# Patient Record
Sex: MALE | Race: WHITE | NOT HISPANIC OR LATINO | Employment: FULL TIME | RURAL
[De-identification: names, ages, dates, MRNs, and addresses within clinical notes are randomized per-mention and may not be internally consistent; named-entity substitution may affect disease eponyms.]

---

## 2022-04-14 RX ORDER — LISINOPRIL 10 MG/1
1 TABLET ORAL DAILY
COMMUNITY
Start: 2022-01-14 | End: 2022-04-18 | Stop reason: SDUPTHER

## 2022-04-14 RX ORDER — ATORVASTATIN CALCIUM 20 MG/1
20 TABLET, FILM COATED ORAL DAILY
Qty: 30 TABLET | Refills: 0 | Status: CANCELLED | OUTPATIENT
Start: 2022-04-14

## 2022-04-14 RX ORDER — LISINOPRIL 10 MG/1
10 TABLET ORAL DAILY
Qty: 30 TABLET | Refills: 0 | Status: CANCELLED | OUTPATIENT
Start: 2022-04-14

## 2022-04-14 NOTE — TELEPHONE ENCOUNTER
Incoming Refill Request      Medication requested (name and dose): ATORVASTATIN ??; LISINOPRIL ??    Pharmacy where request should be sent: [facility]    Additional details provided by patient: HAS APPT FOR 4/27/22 WITH WILBERT Cardoza call back number: [phone]    Does the patient have less than a 3 day supply:  [x] Yes  [] No    Gary GARZON Rep  04/14/22, 11:27 EDT

## 2022-04-18 RX ORDER — ATORVASTATIN CALCIUM 20 MG/1
20 TABLET, FILM COATED ORAL DAILY
Qty: 30 TABLET | Refills: 0 | Status: SHIPPED | OUTPATIENT
Start: 2022-04-18 | End: 2022-04-27 | Stop reason: SDUPTHER

## 2022-04-18 RX ORDER — LISINOPRIL 10 MG/1
10 TABLET ORAL DAILY
Qty: 30 TABLET | Refills: 0 | Status: SHIPPED | OUTPATIENT
Start: 2022-04-18 | End: 2022-04-27 | Stop reason: SDUPTHER

## 2022-04-20 ENCOUNTER — TELEPHONE (OUTPATIENT)
Dept: FAMILY MEDICINE CLINIC | Facility: CLINIC | Age: 62
End: 2022-04-20

## 2022-04-20 NOTE — TELEPHONE ENCOUNTER
Please check with patient- not sure why he said med was denied- looks like refilled atorvastatin and lisinopril yesterday upon request-- please make sure pharmacy received. Thanks.

## 2022-04-27 ENCOUNTER — OFFICE VISIT (OUTPATIENT)
Dept: FAMILY MEDICINE CLINIC | Facility: CLINIC | Age: 62
End: 2022-04-27

## 2022-04-27 VITALS
WEIGHT: 186 LBS | DIASTOLIC BLOOD PRESSURE: 82 MMHG | BODY MASS INDEX: 29.19 KG/M2 | HEIGHT: 67 IN | SYSTOLIC BLOOD PRESSURE: 122 MMHG | HEART RATE: 75 BPM | OXYGEN SATURATION: 97 %

## 2022-04-27 DIAGNOSIS — I10 HYPERTENSION, ESSENTIAL: ICD-10-CM

## 2022-04-27 DIAGNOSIS — L72.3 SEBACEOUS CYST: Primary | ICD-10-CM

## 2022-04-27 DIAGNOSIS — E78.2 HYPERLIPIDEMIA, MIXED: ICD-10-CM

## 2022-04-27 PROCEDURE — 99214 OFFICE O/P EST MOD 30 MIN: CPT | Performed by: PHYSICIAN ASSISTANT

## 2022-04-27 RX ORDER — ATORVASTATIN CALCIUM 20 MG/1
20 TABLET, FILM COATED ORAL DAILY
Qty: 90 TABLET | Refills: 1 | Status: SHIPPED | OUTPATIENT
Start: 2022-04-27 | End: 2022-11-28

## 2022-04-27 RX ORDER — LISINOPRIL 10 MG/1
10 TABLET ORAL DAILY
Qty: 90 TABLET | Refills: 1 | Status: SHIPPED | OUTPATIENT
Start: 2022-04-27 | End: 2022-11-28

## 2022-04-27 RX ORDER — DOXYCYCLINE HYCLATE 100 MG/1
100 CAPSULE ORAL 2 TIMES DAILY
Qty: 20 CAPSULE | Refills: 0 | Status: SHIPPED | OUTPATIENT
Start: 2022-04-27 | End: 2022-12-12

## 2022-04-27 NOTE — PROGRESS NOTES
"Chief Complaint  Cyst (C/O cyst on back onset about 2 weeks. ), Hyperlipidemia, and Hypertension    Subjective          Kirt Lundberg presents to [facility]  Patient in today for medication recheck and refills.  States blood pressure has been doing well.  Is here fasting for his labs.  He has been bothered by cyst on his upper back for the last 2 to 3 weeks.  States has a history of sebaceous cyst but often times they go away on their own.  Denies any fever associated.  Is due for colon cancer screening.  States will consider to have colonoscopy done and call us back if would like to schedule.  Denies any abnormal changes to bowel habits.    Cyst  This is a new problem. Pertinent negatives include no abdominal pain, chest pain, congestion, fever, nausea, sore throat or vomiting.   Hyperlipidemia  This is a chronic problem. Pertinent negatives include no chest pain or shortness of breath. Current antihyperlipidemic treatment includes statins. Risk factors for coronary artery disease include male sex, dyslipidemia and hypertension.   Hypertension  This is a chronic problem. Pertinent negatives include no chest pain, peripheral edema or shortness of breath. Current antihypertension treatment includes ACE inhibitors.       Objective   Vital Signs:   /82   Pulse 75   Ht 170.2 cm (67\")   Wt 84.4 kg (186 lb)   SpO2 97%   BMI 29.13 kg/m²     Body mass index is 29.13 kg/m².    Review of Systems   Constitutional: Negative for fever.   HENT: Negative for congestion and sore throat.    Respiratory: Negative for shortness of breath.    Cardiovascular: Negative for chest pain and leg swelling.   Gastrointestinal: Negative for abdominal pain, nausea and vomiting.   Skin: Positive for skin lesions.       Past History:  Medical History: has a past medical history of GERD (gastroesophageal reflux disease), Hyperlipidemia, Hypertension, and IBS (irritable bowel syndrome).   Surgical History: " has no past surgical history on file.   Family History: family history includes Heart attack in his father; Hypertension in his father and mother; Throat cancer in his father.   Social History: reports that he quit smoking about 19 years ago. His smoking use included cigarettes. He started smoking about 49 years ago. He has a 15.00 pack-year smoking history. He has never used smokeless tobacco. He reports current alcohol use. Drug use questions deferred to the physician.      Current Outpatient Medications:   •  atorvastatin (LIPITOR) 20 MG tablet, Take 1 tablet by mouth Daily., Disp: 90 tablet, Rfl: 1  •  lisinopril (PRINIVIL,ZESTRIL) 10 MG tablet, Take 1 tablet by mouth Daily., Disp: 90 tablet, Rfl: 1  •  doxycycline (VIBRAMYCIN) 100 MG capsule, Take 1 capsule by mouth 2 (Two) Times a Day., Disp: 20 capsule, Rfl: 0  Allergies: Patient has no known allergies.    Physical Exam  Constitutional:       Appearance: Normal appearance.   HENT:      Right Ear: Tympanic membrane normal.      Left Ear: Tympanic membrane normal.      Mouth/Throat:      Pharynx: Oropharynx is clear.   Eyes:      Conjunctiva/sclera: Conjunctivae normal.      Pupils: Pupils are equal, round, and reactive to light.   Cardiovascular:      Rate and Rhythm: Normal rate and regular rhythm.      Heart sounds: Normal heart sounds.   Pulmonary:      Effort: Pulmonary effort is normal.      Breath sounds: Normal breath sounds.   Abdominal:      Palpations: Abdomen is soft.      Tenderness: There is no abdominal tenderness.   Skin:     Comments: Large cyst to upper back  1 1/2 inch in diameter with mild redness around border and thickness underneath   Neurological:      Mental Status: He is oriented to person, place, and time.   Psychiatric:         Mood and Affect: Mood normal.         Behavior: Behavior normal.             Assessment and Plan   Diagnoses and all orders for this visit:    1. Sebaceous cyst (Primary)  Will start on antibiotic and get in  with general surgery for consult to area . Warm compresses to area.   2. Hypertension, essential  -     CBC & Differential; Future  -     Comprehensive Metabolic Panel; Future  -     TSH; Future  Continue current medication. Encouraged healthy diet/exercise. RTC prior to recheck as needed. Encouraged to get in for screening colonoscopy.   3. Hyperlipidemia, mixed  -     Lipid Panel; Future  Fasting labs today. Continue atorvastatin.   Other orders  -     doxycycline (VIBRAMYCIN) 100 MG capsule; Take 1 capsule by mouth 2 (Two) Times a Day.  Dispense: 20 capsule; Refill: 0  -     atorvastatin (LIPITOR) 20 MG tablet; Take 1 tablet by mouth Daily.  Dispense: 90 tablet; Refill: 1  -     lisinopril (PRINIVIL,ZESTRIL) 10 MG tablet; Take 1 tablet by mouth Daily.  Dispense: 90 tablet; Refill: 1            Follow Up   Return in about 6 months (around 10/27/2022).  Patient was given instructions and counseling regarding his condition or for health maintenance advice. Please see specific information pulled into the AVS if appropriate.     Lizabeth Mane PA-C

## 2022-04-28 LAB
ALBUMIN SERPL-MCNC: 4.6 G/DL (ref 3.8–4.8)
ALBUMIN/GLOB SERPL: 1.8 {RATIO} (ref 1.2–2.2)
ALP SERPL-CCNC: 142 IU/L (ref 44–121)
ALT SERPL-CCNC: 24 IU/L (ref 0–44)
AST SERPL-CCNC: 23 IU/L (ref 0–40)
BASOPHILS # BLD AUTO: 0.1 X10E3/UL (ref 0–0.2)
BASOPHILS NFR BLD AUTO: 1 %
BILIRUB SERPL-MCNC: 0.3 MG/DL (ref 0–1.2)
BUN SERPL-MCNC: 17 MG/DL (ref 8–27)
BUN/CREAT SERPL: 16 (ref 10–24)
CALCIUM SERPL-MCNC: 9.3 MG/DL (ref 8.6–10.2)
CHLORIDE SERPL-SCNC: 104 MMOL/L (ref 96–106)
CHOLEST SERPL-MCNC: 196 MG/DL (ref 100–199)
CO2 SERPL-SCNC: 18 MMOL/L (ref 20–29)
CREAT SERPL-MCNC: 1.05 MG/DL (ref 0.76–1.27)
EGFRCR SERPLBLD CKD-EPI 2021: 80 ML/MIN/1.73
EOSINOPHIL # BLD AUTO: 0.4 X10E3/UL (ref 0–0.4)
EOSINOPHIL NFR BLD AUTO: 4 %
ERYTHROCYTE [DISTWIDTH] IN BLOOD BY AUTOMATED COUNT: 13.6 % (ref 11.6–15.4)
GLOBULIN SER CALC-MCNC: 2.6 G/DL (ref 1.5–4.5)
GLUCOSE SERPL-MCNC: 86 MG/DL (ref 65–99)
HCT VFR BLD AUTO: 45.4 % (ref 37.5–51)
HDLC SERPL-MCNC: 44 MG/DL
HGB BLD-MCNC: 15.8 G/DL (ref 13–17.7)
IMM GRANULOCYTES # BLD AUTO: 0.1 X10E3/UL (ref 0–0.1)
IMM GRANULOCYTES NFR BLD AUTO: 1 %
LDLC SERPL CALC-MCNC: 129 MG/DL (ref 0–99)
LYMPHOCYTES # BLD AUTO: 3 X10E3/UL (ref 0.7–3.1)
LYMPHOCYTES NFR BLD AUTO: 28 %
MCH RBC QN AUTO: 28.9 PG (ref 26.6–33)
MCHC RBC AUTO-ENTMCNC: 34.8 G/DL (ref 31.5–35.7)
MCV RBC AUTO: 83 FL (ref 79–97)
MONOCYTES # BLD AUTO: 0.8 X10E3/UL (ref 0.1–0.9)
MONOCYTES NFR BLD AUTO: 8 %
NEUTROPHILS # BLD AUTO: 6.4 X10E3/UL (ref 1.4–7)
NEUTROPHILS NFR BLD AUTO: 58 %
PLATELET # BLD AUTO: 328 X10E3/UL (ref 150–450)
POTASSIUM SERPL-SCNC: 4.9 MMOL/L (ref 3.5–5.2)
PROT SERPL-MCNC: 7.2 G/DL (ref 6–8.5)
RBC # BLD AUTO: 5.46 X10E6/UL (ref 4.14–5.8)
SODIUM SERPL-SCNC: 142 MMOL/L (ref 134–144)
TRIGL SERPL-MCNC: 130 MG/DL (ref 0–149)
TSH SERPL DL<=0.005 MIU/L-ACNC: 2.49 UIU/ML (ref 0.45–4.5)
VLDLC SERPL CALC-MCNC: 23 MG/DL (ref 5–40)
WBC # BLD AUTO: 10.8 X10E3/UL (ref 3.4–10.8)

## 2022-05-01 DIAGNOSIS — R74.8 ELEVATED ALKALINE PHOSPHATASE LEVEL: Primary | ICD-10-CM

## 2022-05-02 ENCOUNTER — TELEPHONE (OUTPATIENT)
Dept: FAMILY MEDICINE CLINIC | Facility: CLINIC | Age: 62
End: 2022-05-02

## 2022-09-19 ENCOUNTER — TELEPHONE (OUTPATIENT)
Dept: FAMILY MEDICINE CLINIC | Facility: CLINIC | Age: 62
End: 2022-09-19

## 2022-10-11 ENCOUNTER — LAB (OUTPATIENT)
Dept: FAMILY MEDICINE CLINIC | Facility: CLINIC | Age: 62
End: 2022-10-11

## 2022-10-11 DIAGNOSIS — R74.8 ELEVATED ALKALINE PHOSPHATASE LEVEL: ICD-10-CM

## 2022-10-11 PROCEDURE — 36415 COLL VENOUS BLD VENIPUNCTURE: CPT | Performed by: PHYSICIAN ASSISTANT

## 2022-10-12 LAB
ALBUMIN SERPL-MCNC: 4.5 G/DL (ref 3.8–4.8)
ALBUMIN/GLOB SERPL: 2 {RATIO} (ref 1.2–2.2)
ALP SERPL-CCNC: 143 IU/L (ref 44–121)
ALT SERPL-CCNC: 38 IU/L (ref 0–44)
AST SERPL-CCNC: 29 IU/L (ref 0–40)
BILIRUB SERPL-MCNC: 0.3 MG/DL (ref 0–1.2)
BUN SERPL-MCNC: 14 MG/DL (ref 8–27)
BUN/CREAT SERPL: 14 (ref 10–24)
CALCIUM SERPL-MCNC: 9 MG/DL (ref 8.6–10.2)
CHLORIDE SERPL-SCNC: 106 MMOL/L (ref 96–106)
CO2 SERPL-SCNC: 22 MMOL/L (ref 20–29)
CREAT SERPL-MCNC: 1.03 MG/DL (ref 0.76–1.27)
EGFRCR SERPLBLD CKD-EPI 2021: 82 ML/MIN/1.73
GLOBULIN SER CALC-MCNC: 2.2 G/DL (ref 1.5–4.5)
GLUCOSE SERPL-MCNC: 103 MG/DL (ref 70–99)
POTASSIUM SERPL-SCNC: 4.3 MMOL/L (ref 3.5–5.2)
PROT SERPL-MCNC: 6.7 G/DL (ref 6–8.5)
SODIUM SERPL-SCNC: 142 MMOL/L (ref 134–144)

## 2022-10-13 DIAGNOSIS — R74.8 ELEVATED ALKALINE PHOSPHATASE LEVEL: Primary | ICD-10-CM

## 2022-11-28 RX ORDER — LISINOPRIL 10 MG/1
TABLET ORAL
Qty: 90 TABLET | Refills: 0 | Status: SHIPPED | OUTPATIENT
Start: 2022-11-28 | End: 2022-12-12 | Stop reason: SDUPTHER

## 2022-11-28 RX ORDER — ATORVASTATIN CALCIUM 20 MG/1
TABLET, FILM COATED ORAL
Qty: 90 TABLET | Refills: 0 | Status: SHIPPED | OUTPATIENT
Start: 2022-11-28 | End: 2022-12-12 | Stop reason: SDUPTHER

## 2022-12-12 ENCOUNTER — OFFICE VISIT (OUTPATIENT)
Dept: FAMILY MEDICINE CLINIC | Facility: CLINIC | Age: 62
End: 2022-12-12

## 2022-12-12 VITALS
BODY MASS INDEX: 30.29 KG/M2 | HEART RATE: 71 BPM | HEIGHT: 67 IN | DIASTOLIC BLOOD PRESSURE: 74 MMHG | OXYGEN SATURATION: 97 % | SYSTOLIC BLOOD PRESSURE: 112 MMHG | WEIGHT: 193 LBS

## 2022-12-12 DIAGNOSIS — R73.9 HYPERGLYCEMIA: ICD-10-CM

## 2022-12-12 DIAGNOSIS — I10 HYPERTENSION, ESSENTIAL: Primary | ICD-10-CM

## 2022-12-12 DIAGNOSIS — Z11.59 ENCOUNTER FOR HEPATITIS C SCREENING TEST FOR LOW RISK PATIENT: ICD-10-CM

## 2022-12-12 DIAGNOSIS — Z12.5 PROSTATE CANCER SCREENING: ICD-10-CM

## 2022-12-12 DIAGNOSIS — E78.2 HYPERLIPIDEMIA, MIXED: ICD-10-CM

## 2022-12-12 PROCEDURE — 36415 COLL VENOUS BLD VENIPUNCTURE: CPT | Performed by: PHYSICIAN ASSISTANT

## 2022-12-12 PROCEDURE — 99214 OFFICE O/P EST MOD 30 MIN: CPT | Performed by: PHYSICIAN ASSISTANT

## 2022-12-12 RX ORDER — LISINOPRIL 10 MG/1
10 TABLET ORAL DAILY
Qty: 90 TABLET | Refills: 0 | Status: SHIPPED | OUTPATIENT
Start: 2022-12-12 | End: 2023-03-03

## 2022-12-12 RX ORDER — ATORVASTATIN CALCIUM 20 MG/1
20 TABLET, FILM COATED ORAL DAILY
Qty: 90 TABLET | Refills: 0 | Status: SHIPPED | OUTPATIENT
Start: 2022-12-12 | End: 2023-03-03

## 2022-12-12 NOTE — PROGRESS NOTES
"Chief Complaint  Med Refill (Check up and BW )    Subjective          Kirt Lundberg presents to [facility]  History of Present Illness  Patient in  today for medication recheck and refills.  States has been feeling well.  States his blood pressure has been doing well.  Denies any chest pain or shortness of breath.  He is in follow-up with GI for mild elevation to alk phosphatase.  He has a colonoscopy scheduled next month.  Hypertension  This is a chronic problem. Pertinent negatives include no blurred vision, chest pain, headaches, palpitations, peripheral edema or shortness of breath. Current antihypertension treatment includes ACE inhibitors.   Hyperlipidemia  This is a chronic problem. Pertinent negatives include no chest pain or shortness of breath. Current antihyperlipidemic treatment includes statins.       Objective   Vital Signs:   /74 (BP Location: Left arm, Patient Position: Sitting, Cuff Size: Large Adult)   Pulse 71   Ht 170.2 cm (67\")   Wt 87.5 kg (193 lb)   SpO2 97%   BMI 30.23 kg/m²     Body mass index is 30.23 kg/m².    Review of Systems   Constitutional: Negative for fatigue and fever.   HENT: Negative for congestion and sore throat.    Eyes: Negative for blurred vision.   Respiratory: Negative for shortness of breath.    Cardiovascular: Negative for chest pain and palpitations.   Gastrointestinal: Negative for abdominal pain, blood in stool, diarrhea, nausea and vomiting.   Neurological: Negative for dizziness and headache.       Past History:  Medical History: has a past medical history of GERD (gastroesophageal reflux disease), Hyperlipidemia, Hypertension, and IBS (irritable bowel syndrome).   Surgical History: has no past surgical history on file.   Family History: family history includes Heart attack in his father; Hypertension in his father and mother; Throat cancer in his father.   Social History: reports that he has been smoking cigars. He has " never used smokeless tobacco. He reports current alcohol use. Drug use questions deferred to the physician.      Current Outpatient Medications:   •  atorvastatin (LIPITOR) 20 MG tablet, Take 1 tablet by mouth Daily., Disp: 90 tablet, Rfl: 0  •  lisinopril (PRINIVIL,ZESTRIL) 10 MG tablet, Take 1 tablet by mouth Daily., Disp: 90 tablet, Rfl: 0  Allergies: Patient has no known allergies.    Physical Exam  Constitutional:       Appearance: Normal appearance.   HENT:      Right Ear: Tympanic membrane normal.      Left Ear: Tympanic membrane normal.      Mouth/Throat:      Pharynx: Oropharynx is clear.   Eyes:      Conjunctiva/sclera: Conjunctivae normal.      Pupils: Pupils are equal, round, and reactive to light.   Cardiovascular:      Rate and Rhythm: Normal rate and regular rhythm.      Heart sounds: Normal heart sounds.   Pulmonary:      Effort: Pulmonary effort is normal.      Breath sounds: Normal breath sounds.   Abdominal:      Palpations: Abdomen is soft.      Tenderness: There is no abdominal tenderness.   Musculoskeletal:      Right lower leg: No edema.      Left lower leg: No edema.   Neurological:      Mental Status: He is oriented to person, place, and time.   Psychiatric:         Mood and Affect: Mood normal.         Behavior: Behavior normal.             Assessment and Plan   Diagnoses and all orders for this visit:    1. Hypertension, essential (Primary)  -     CBC & Differential; Future  -     CBC & Differential  Continue current medication.  Encouraged healthy diet and exercise.  Fasting labs today.  Return to clinic prior to recheck as needed.  2. Hyperlipidemia, mixed  -     Lipid Panel; Future  -     Lipid Panel  Refilled atorvastatin.  3. Prostate cancer screening  -     PSA Screen; Future  -     PSA Screen  PSA with labs.  4. Encounter for hepatitis C screening test for low risk patient  -     Hepatitis C Antibody; Future  -     Hepatitis C Antibody    5. Hyperglycemia  -     Comprehensive  Metabolic Panel; Future  -     Hemoglobin A1c; Future  -     Comprehensive Metabolic Panel  -     Hemoglobin A1c  Check hemoglobin A1c.  Encouraged healthy diet and exercise.  Other orders  -     atorvastatin (LIPITOR) 20 MG tablet; Take 1 tablet by mouth Daily.  Dispense: 90 tablet; Refill: 0  -     lisinopril (PRINIVIL,ZESTRIL) 10 MG tablet; Take 1 tablet by mouth Daily.  Dispense: 90 tablet; Refill: 0            Follow Up   Return in about 6 months (around 6/12/2023).  Patient was given instructions and counseling regarding his condition or for health maintenance advice. Please see specific information pulled into the AVS if appropriate.     Lizabeth Mane PA-C

## 2022-12-13 DIAGNOSIS — D72.829 LEUKOCYTOSIS, UNSPECIFIED TYPE: Primary | ICD-10-CM

## 2022-12-13 LAB
ALBUMIN SERPL-MCNC: 4.4 G/DL (ref 3.8–4.8)
ALBUMIN/GLOB SERPL: 1.9 {RATIO} (ref 1.2–2.2)
ALP SERPL-CCNC: 138 IU/L (ref 44–121)
ALT SERPL-CCNC: 36 IU/L (ref 0–44)
AST SERPL-CCNC: 26 IU/L (ref 0–40)
BASOPHILS # BLD AUTO: 0.1 X10E3/UL (ref 0–0.2)
BASOPHILS NFR BLD AUTO: 1 %
BILIRUB SERPL-MCNC: 0.3 MG/DL (ref 0–1.2)
BUN SERPL-MCNC: 15 MG/DL (ref 8–27)
BUN/CREAT SERPL: 14 (ref 10–24)
CALCIUM SERPL-MCNC: 9.8 MG/DL (ref 8.6–10.2)
CHLORIDE SERPL-SCNC: 106 MMOL/L (ref 96–106)
CHOLEST SERPL-MCNC: 138 MG/DL (ref 100–199)
CO2 SERPL-SCNC: 22 MMOL/L (ref 20–29)
CREAT SERPL-MCNC: 1.08 MG/DL (ref 0.76–1.27)
EGFRCR SERPLBLD CKD-EPI 2021: 78 ML/MIN/1.73
EOSINOPHIL # BLD AUTO: 0.5 X10E3/UL (ref 0–0.4)
EOSINOPHIL NFR BLD AUTO: 4 %
ERYTHROCYTE [DISTWIDTH] IN BLOOD BY AUTOMATED COUNT: 13.6 % (ref 11.6–15.4)
GLOBULIN SER CALC-MCNC: 2.3 G/DL (ref 1.5–4.5)
GLUCOSE SERPL-MCNC: 105 MG/DL (ref 70–99)
HBA1C MFR BLD: 5.6 % (ref 4.8–5.6)
HCT VFR BLD AUTO: 46.2 % (ref 37.5–51)
HCV AB S/CO SERPL IA: <0.1 S/CO RATIO (ref 0–0.9)
HDLC SERPL-MCNC: 42 MG/DL
HGB BLD-MCNC: 15.4 G/DL (ref 13–17.7)
IMM GRANULOCYTES # BLD AUTO: 0 X10E3/UL (ref 0–0.1)
IMM GRANULOCYTES NFR BLD AUTO: 0 %
LDLC SERPL CALC-MCNC: 74 MG/DL (ref 0–99)
LYMPHOCYTES # BLD AUTO: 3.2 X10E3/UL (ref 0.7–3.1)
LYMPHOCYTES NFR BLD AUTO: 29 %
MCH RBC QN AUTO: 28.5 PG (ref 26.6–33)
MCHC RBC AUTO-ENTMCNC: 33.3 G/DL (ref 31.5–35.7)
MCV RBC AUTO: 86 FL (ref 79–97)
MONOCYTES # BLD AUTO: 0.9 X10E3/UL (ref 0.1–0.9)
MONOCYTES NFR BLD AUTO: 8 %
NEUTROPHILS # BLD AUTO: 6.5 X10E3/UL (ref 1.4–7)
NEUTROPHILS NFR BLD AUTO: 58 %
PLATELET # BLD AUTO: 289 X10E3/UL (ref 150–450)
POTASSIUM SERPL-SCNC: 5 MMOL/L (ref 3.5–5.2)
PROT SERPL-MCNC: 6.7 G/DL (ref 6–8.5)
PSA SERPL-MCNC: 1.3 NG/ML (ref 0–4)
RBC # BLD AUTO: 5.4 X10E6/UL (ref 4.14–5.8)
SODIUM SERPL-SCNC: 141 MMOL/L (ref 134–144)
TRIGL SERPL-MCNC: 122 MG/DL (ref 0–149)
VLDLC SERPL CALC-MCNC: 22 MG/DL (ref 5–40)
WBC # BLD AUTO: 11.2 X10E3/UL (ref 3.4–10.8)

## 2023-01-30 ENCOUNTER — LAB (OUTPATIENT)
Dept: FAMILY MEDICINE CLINIC | Facility: CLINIC | Age: 63
End: 2023-01-30
Payer: COMMERCIAL

## 2023-01-30 DIAGNOSIS — D72.829 LEUKOCYTOSIS, UNSPECIFIED TYPE: ICD-10-CM

## 2023-01-30 PROCEDURE — 36415 COLL VENOUS BLD VENIPUNCTURE: CPT | Performed by: PHYSICIAN ASSISTANT

## 2023-01-31 DIAGNOSIS — D72.829 LEUKOCYTOSIS, UNSPECIFIED TYPE: Primary | ICD-10-CM

## 2023-01-31 LAB
BASOPHILS # BLD AUTO: 0.1 X10E3/UL (ref 0–0.2)
BASOPHILS NFR BLD AUTO: 1 %
EOSINOPHIL # BLD AUTO: 0.4 X10E3/UL (ref 0–0.4)
EOSINOPHIL NFR BLD AUTO: 3 %
ERYTHROCYTE [DISTWIDTH] IN BLOOD BY AUTOMATED COUNT: 13.6 % (ref 11.6–15.4)
HCT VFR BLD AUTO: 44.9 % (ref 37.5–51)
HGB BLD-MCNC: 14.9 G/DL (ref 13–17.7)
IMM GRANULOCYTES # BLD AUTO: 0.1 X10E3/UL (ref 0–0.1)
IMM GRANULOCYTES NFR BLD AUTO: 1 %
LYMPHOCYTES # BLD AUTO: 4 X10E3/UL (ref 0.7–3.1)
LYMPHOCYTES NFR BLD AUTO: 36 %
MCH RBC QN AUTO: 28.7 PG (ref 26.6–33)
MCHC RBC AUTO-ENTMCNC: 33.2 G/DL (ref 31.5–35.7)
MCV RBC AUTO: 86 FL (ref 79–97)
MONOCYTES # BLD AUTO: 1.1 X10E3/UL (ref 0.1–0.9)
MONOCYTES NFR BLD AUTO: 10 %
NEUTROPHILS # BLD AUTO: 5.5 X10E3/UL (ref 1.4–7)
NEUTROPHILS NFR BLD AUTO: 49 %
PLATELET # BLD AUTO: 314 X10E3/UL (ref 150–450)
RBC # BLD AUTO: 5.2 X10E6/UL (ref 4.14–5.8)
WBC # BLD AUTO: 11.1 X10E3/UL (ref 3.4–10.8)

## 2023-03-03 RX ORDER — ATORVASTATIN CALCIUM 20 MG/1
TABLET, FILM COATED ORAL
Qty: 90 TABLET | Refills: 0 | Status: SHIPPED | OUTPATIENT
Start: 2023-03-03

## 2023-03-03 RX ORDER — LISINOPRIL 10 MG/1
TABLET ORAL
Qty: 90 TABLET | Refills: 0 | Status: SHIPPED | OUTPATIENT
Start: 2023-03-03

## 2023-03-14 ENCOUNTER — LAB (OUTPATIENT)
Dept: FAMILY MEDICINE CLINIC | Facility: CLINIC | Age: 63
End: 2023-03-14
Payer: COMMERCIAL

## 2023-03-14 DIAGNOSIS — D72.829 LEUKOCYTOSIS, UNSPECIFIED TYPE: ICD-10-CM

## 2023-03-14 PROCEDURE — 36415 COLL VENOUS BLD VENIPUNCTURE: CPT | Performed by: PHYSICIAN ASSISTANT

## 2023-03-15 DIAGNOSIS — D72.829 LEUKOCYTOSIS, UNSPECIFIED TYPE: Primary | ICD-10-CM

## 2023-03-15 LAB
BASOPHILS # BLD AUTO: 0.1 X10E3/UL (ref 0–0.2)
BASOPHILS NFR BLD AUTO: 1 %
EOSINOPHIL # BLD AUTO: 0.4 X10E3/UL (ref 0–0.4)
EOSINOPHIL NFR BLD AUTO: 4 %
ERYTHROCYTE [DISTWIDTH] IN BLOOD BY AUTOMATED COUNT: 13.6 % (ref 11.6–15.4)
HCT VFR BLD AUTO: 43.9 % (ref 37.5–51)
HGB BLD-MCNC: 14.6 G/DL (ref 13–17.7)
IMM GRANULOCYTES # BLD AUTO: 0 X10E3/UL (ref 0–0.1)
IMM GRANULOCYTES NFR BLD AUTO: 0 %
LYMPHOCYTES # BLD AUTO: 4 X10E3/UL (ref 0.7–3.1)
LYMPHOCYTES NFR BLD AUTO: 36 %
MCH RBC QN AUTO: 28.3 PG (ref 26.6–33)
MCHC RBC AUTO-ENTMCNC: 33.3 G/DL (ref 31.5–35.7)
MCV RBC AUTO: 85 FL (ref 79–97)
MONOCYTES # BLD AUTO: 0.9 X10E3/UL (ref 0.1–0.9)
MONOCYTES NFR BLD AUTO: 8 %
NEUTROPHILS # BLD AUTO: 5.5 X10E3/UL (ref 1.4–7)
NEUTROPHILS NFR BLD AUTO: 51 %
PLATELET # BLD AUTO: 299 X10E3/UL (ref 150–450)
RBC # BLD AUTO: 5.16 X10E6/UL (ref 4.14–5.8)
WBC # BLD AUTO: 10.9 X10E3/UL (ref 3.4–10.8)

## 2023-04-25 ENCOUNTER — CONSULT (OUTPATIENT)
Dept: ONCOLOGY | Facility: CLINIC | Age: 63
End: 2023-04-25
Payer: COMMERCIAL

## 2023-04-25 VITALS
HEIGHT: 67 IN | SYSTOLIC BLOOD PRESSURE: 139 MMHG | HEART RATE: 66 BPM | RESPIRATION RATE: 18 BRPM | BODY MASS INDEX: 30.76 KG/M2 | DIASTOLIC BLOOD PRESSURE: 82 MMHG | WEIGHT: 196 LBS | TEMPERATURE: 98.1 F | OXYGEN SATURATION: 97 %

## 2023-04-25 DIAGNOSIS — D72.820 LYMPHOCYTOSIS: Primary | ICD-10-CM

## 2023-04-25 DIAGNOSIS — C91.10 CLL (CHRONIC LYMPHOCYTIC LEUKEMIA): ICD-10-CM

## 2023-04-25 PROBLEM — D72.829 LEUCOCYTOSIS: Status: ACTIVE | Noted: 2023-04-25

## 2023-04-25 PROCEDURE — 99204 OFFICE O/P NEW MOD 45 MIN: CPT | Performed by: INTERNAL MEDICINE

## 2023-04-25 NOTE — PROGRESS NOTES
CHIEF COMPLAINT: No somatic complaints    REASON FOR REFERRAL: Chronic lymphocytosis      RECORDS OBTAINED  Records of the patients history including those obtained from primary care were reviewed and summarized in detail.    Oncology/Hematology History Overview Note   1.  Leukocytosis  2.  Hypertension  3.  Hyperlipidemia  4.  Hyperglycemia  5.  Elevated alkaline phosphatase 142 on 4/27/2022.  143 on 10/11/2022.  AMA - 11/15/2022 with fractionated alkaline phosphatase normal percentages predominantly liver.  .  12/1/2022 liver ultrasound showing fatty liver.  Gastroenterology recommended screening colonoscopy which was performed by Lucien Aguilar on 1/11/2023 showing diverticula with three 2 mm sessile polyps in the ascending colon with single polypectomy performed in the ascending colon and repeat colonoscopy recommended in 3-5 years.  6.  History of reflux and irritable bowel    Hematology history timeline:  -4/27/2022 white count 10,800 normal with upper limit of normal 10,800 unremarkable CBC  -12/12/2022 white count 11,200 and unremarkable differential with absolute lymphocyte count 3200 upper limit of normal 3100  -1/30/2023 white count 11,100 absolute lymphocyte count 4000  -3/14/2023 white count 10,900 absolute lymphocyte count 4000     Lymphocytosis       HISTORY OF PRESENT ILLNESS:  The patient is a 63 y.o.  male, referred for chronic lymphocytosis without B symptoms    REVIEW OF SYSTEMS:  No somatic complaints    Past Medical History:   Diagnosis Date   • GERD (gastroesophageal reflux disease)    • Hyperlipidemia    • Hypertension    • IBS (irritable bowel syndrome)      No past surgical history on file.    Current Outpatient Medications on File Prior to Visit   Medication Sig Dispense Refill   • atorvastatin (LIPITOR) 20 MG tablet TAKE ONE TABLET BY MOUTH DAILY 90 tablet 0   • lisinopril (PRINIVIL,ZESTRIL) 10 MG tablet TAKE ONE TABLET BY MOUTH DAILY 90 tablet 0     No current facility-administered  "medications on file prior to visit.       No Known Allergies    Social History     Socioeconomic History   • Marital status:    Tobacco Use   • Smoking status: Some Days     Types: Cigars   • Smokeless tobacco: Never   Vaping Use   • Vaping Use: Never used   Substance and Sexual Activity   • Alcohol use: Yes     Comment: Rare   • Drug use: Defer   • Sexual activity: Defer       Family History   Problem Relation Age of Onset   • Hypertension Mother    • Hypertension Father    • Throat cancer Father    • Heart attack Father        PHYSICAL EXAM:  No palpable cervical axillary or inguinal adenopathy.  No hepatosplenomegaly.  No rashes.    /82   Pulse 66   Temp 98.1 °F (36.7 °C)   Resp 18   Ht 170.2 cm (67\")   Wt 88.9 kg (196 lb)   SpO2 97%   BMI 30.70 kg/m²     ECOG score: 0           ECOG: (0) Fully Active - Able to Carry On All Pre-disease Performance Without Restriction    Lab Results   Component Value Date    HGB 14.6 03/14/2023    HCT 43.9 03/14/2023    MCV 85 03/14/2023     03/14/2023    WBC 10.9 (H) 03/14/2023    NEUTROABS 5.5 03/14/2023    LYMPHSABS 4.0 (H) 03/14/2023    MONOSABS 0.9 03/14/2023    EOSABS 0.4 03/14/2023    BASOSABS 0.1 03/14/2023     Lab Results   Component Value Date    GLUCOSE 105 (H) 12/12/2022    BUN 15 12/12/2022    CREATININE 1.08 12/12/2022     12/12/2022    K 5.0 12/12/2022     12/12/2022    CO2 22 12/12/2022    CALCIUM 9.8 12/12/2022    ALBUMIN 4.4 12/12/2022    BILITOT 0.3 12/12/2022    ALKPHOS 138 (H) 12/12/2022    AST 26 12/12/2022    ALT 36 12/12/2022         Assessment & Plan   1.  Leukocytosis  2.  Hypertension  3.  Hyperlipidemia  4.  Hyperglycemia  5.  Elevated alkaline phosphatase 142 on 4/27/2022.  143 on 10/11/2022.  AMA - 11/15/2022 with fractionated alkaline phosphatase normal percentages predominantly liver.  .  12/1/2022 liver ultrasound showing fatty liver.  Gastroenterology recommended screening colonoscopy which was " performed by Lucien Aguilar on 1/11/2023 showing diverticula with three 2 mm sessile polyps in the ascending colon with single polypectomy performed in the ascending colon and repeat colonoscopy recommended in 3-5 years.  6.  History of reflux and irritable bowel    Hematology history timeline:  -4/27/2022 white count 10,800 normal with upper limit of normal 10,800 unremarkable CBC  -12/12/2022 white count 11,200 and unremarkable differential with absolute lymphocyte count 3200 upper limit of normal 3100  -1/30/2023 white count 11,100 absolute lymphocyte count 4000  -3/14/2023 white count 10,900 absolute lymphocyte count 4000    -4/25/2023 [facility] consult: Patient here for evaluation of chronic mild lymphocytosis dating back at least a year and never with an absolute lymphocyte count over 5000.  No bed drenching night sweats or unexplained weight loss or unexplained fevers.  No bulky adenopathy.  Does have fatty liver disease with elevated alkaline phosphatase evaluated by gastroenterology previously.  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  The odds are this is some low-level reactive process but we shall see.    Total time of care today inclusive of time spent today prior to patient's arrival reviewing past medical records and cataloging data as outlined and during visit going over the broad differential of lymphocytosis and the work-up thereof and after visit instituting the plan as outlined above took 45 minutes of patient care time throughout the day today.      Hector Hemphill MD    4/25/2023

## 2023-04-25 NOTE — LETTER
April 25, 2023     Lizabeth Mane PA-C  [address] [facility] [address]    Patient: Kirt Lundberg   YOB: 1960   Date of Visit: 4/25/2023       Dear Lizabeth Mane PA-C    Kirt Lundberg was in my office today. Below is a copy of my note.    If you have questions, please do not hesitate to call me. I look forward to following Kirt along with you.         Sincerely,        Hector Hemphill MD        CC: No Recipients    CHIEF COMPLAINT: No somatic complaints    REASON FOR REFERRAL: Chronic lymphocytosis      RECORDS OBTAINED  Records of the patients history including those obtained from primary care were reviewed and summarized in detail.    Oncology/Hematology History Overview Note   1.  Leukocytosis  2.  Hypertension  3.  Hyperlipidemia  4.  Hyperglycemia  5.  Elevated alkaline phosphatase 142 on 4/27/2022.  143 on 10/11/2022.  AMA - 11/15/2022 with fractionated alkaline phosphatase normal percentages predominantly liver.  .  12/1/2022 liver ultrasound showing fatty liver.  Gastroenterology recommended screening colonoscopy which was performed by Lucien Aguilar on 1/11/2023 showing diverticula with three 2 mm sessile polyps in the ascending colon with single polypectomy performed in the ascending colon and repeat colonoscopy recommended in 3-5 years.  6.  History of reflux and irritable bowel    Hematology history timeline:  -4/27/2022 white count 10,800 normal with upper limit of normal 10,800 unremarkable CBC  -12/12/2022 white count 11,200 and unremarkable differential with absolute lymphocyte count 3200 upper limit of normal 3100  -1/30/2023 white count 11,100 absolute lymphocyte count 4000  -3/14/2023 white count 10,900 absolute lymphocyte count 4000     Lymphocytosis       HISTORY OF PRESENT ILLNESS:  The patient is a 63 y.o.  male, referred for chronic lymphocytosis without B symptoms    REVIEW OF SYSTEMS:  No somatic complaints    Past Medical History:   Diagnosis Date   •  "GERD (gastroesophageal reflux disease)    • Hyperlipidemia    • Hypertension    • IBS (irritable bowel syndrome)      No past surgical history on file.    Current Outpatient Medications on File Prior to Visit   Medication Sig Dispense Refill   • atorvastatin (LIPITOR) 20 MG tablet TAKE ONE TABLET BY MOUTH DAILY 90 tablet 0   • lisinopril (PRINIVIL,ZESTRIL) 10 MG tablet TAKE ONE TABLET BY MOUTH DAILY 90 tablet 0     No current facility-administered medications on file prior to visit.       No Known Allergies    Social History     Socioeconomic History   • Marital status:    Tobacco Use   • Smoking status: Some Days     Types: Cigars   • Smokeless tobacco: Never   Vaping Use   • Vaping Use: Never used   Substance and Sexual Activity   • Alcohol use: Yes     Comment: Rare   • Drug use: Defer   • Sexual activity: Defer       Family History   Problem Relation Age of Onset   • Hypertension Mother    • Hypertension Father    • Throat cancer Father    • Heart attack Father        PHYSICAL EXAM:  No palpable cervical axillary or inguinal adenopathy.  No hepatosplenomegaly.  No rashes.    /82   Pulse 66   Temp 98.1 °F (36.7 °C)   Resp 18   Ht 170.2 cm (67\")   Wt 88.9 kg (196 lb)   SpO2 97%   BMI 30.70 kg/m²     ECOG score: 0           ECOG: (0) Fully Active - Able to Carry On All Pre-disease Performance Without Restriction    Lab Results   Component Value Date    HGB 14.6 03/14/2023    HCT 43.9 03/14/2023    MCV 85 03/14/2023     03/14/2023    WBC 10.9 (H) 03/14/2023    NEUTROABS 5.5 03/14/2023    LYMPHSABS 4.0 (H) 03/14/2023    MONOSABS 0.9 03/14/2023    EOSABS 0.4 03/14/2023    BASOSABS 0.1 03/14/2023     Lab Results   Component Value Date    GLUCOSE 105 (H) 12/12/2022    BUN 15 12/12/2022    CREATININE 1.08 12/12/2022     12/12/2022    K 5.0 12/12/2022     12/12/2022    CO2 22 12/12/2022    CALCIUM 9.8 12/12/2022    ALBUMIN 4.4 12/12/2022    BILITOT 0.3 12/12/2022    ALKPHOS 138 (H) " 12/12/2022    AST 26 12/12/2022    ALT 36 12/12/2022         Assessment & Plan   1.  Leukocytosis  2.  Hypertension  3.  Hyperlipidemia  4.  Hyperglycemia  5.  Elevated alkaline phosphatase 142 on 4/27/2022.  143 on 10/11/2022.  AMA - 11/15/2022 with fractionated alkaline phosphatase normal percentages predominantly liver.  .  12/1/2022 liver ultrasound showing fatty liver.  Gastroenterology recommended screening colonoscopy which was performed by Lucien Aguilar on 1/11/2023 showing diverticula with three 2 mm sessile polyps in the ascending colon with single polypectomy performed in the ascending colon and repeat colonoscopy recommended in 3-5 years.  6.  History of reflux and irritable bowel    Hematology history timeline:  -4/27/2022 white count 10,800 normal with upper limit of normal 10,800 unremarkable CBC  -12/12/2022 white count 11,200 and unremarkable differential with absolute lymphocyte count 3200 upper limit of normal 3100  -1/30/2023 white count 11,100 absolute lymphocyte count 4000  -3/14/2023 white count 10,900 absolute lymphocyte count 4000    -4/25/2023 [facility] consult: Patient here for evaluation of chronic mild lymphocytosis dating back at least a year and never with an absolute lymphocyte count over 5000.  No bed drenching night sweats or unexplained weight loss or unexplained fevers.  No bulky adenopathy.  Does have fatty liver disease with elevated alkaline phosphatase evaluated by gastroenterology previously.  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  The odds are this is some low-level reactive process but we shall see.    Total time of care today inclusive of  time spent today prior to patient's arrival reviewing past medical records and cataloging data as outlined and during visit going over the broad differential of lymphocytosis and the work-up thereof and after visit instituting the plan as outlined above took 45 minutes of patient care time throughout the day today.      Hector Hemphill MD    4/25/2023

## 2023-05-22 ENCOUNTER — LAB (OUTPATIENT)
Dept: LAB | Facility: HOSPITAL | Age: 63
End: 2023-05-22
Payer: COMMERCIAL

## 2023-05-22 DIAGNOSIS — C91.10 CLL (CHRONIC LYMPHOCYTIC LEUKEMIA): Primary | ICD-10-CM

## 2023-05-22 LAB
BASOPHILS # BLD AUTO: 0.05 10*3/MM3 (ref 0–0.2)
BASOPHILS NFR BLD AUTO: 0.5 % (ref 0–1.5)
DEPRECATED RDW RBC AUTO: 42.4 FL (ref 37–54)
EOSINOPHIL # BLD AUTO: 0.3 10*3/MM3 (ref 0–0.4)
EOSINOPHIL NFR BLD AUTO: 3.1 % (ref 0.3–6.2)
ERYTHROCYTE [DISTWIDTH] IN BLOOD BY AUTOMATED COUNT: 13.8 % (ref 12.3–15.4)
HCT VFR BLD AUTO: 44.7 % (ref 37.5–51)
HGB BLD-MCNC: 15.3 G/DL (ref 13–17.7)
IMM GRANULOCYTES # BLD AUTO: 0.03 10*3/MM3 (ref 0–0.05)
IMM GRANULOCYTES NFR BLD AUTO: 0.3 % (ref 0–0.5)
LYMPHOCYTES # BLD AUTO: 3.36 10*3/MM3 (ref 0.7–3.1)
LYMPHOCYTES NFR BLD AUTO: 35.1 % (ref 19.6–45.3)
MCH RBC QN AUTO: 28.9 PG (ref 26.6–33)
MCHC RBC AUTO-ENTMCNC: 34.2 G/DL (ref 31.5–35.7)
MCV RBC AUTO: 84.5 FL (ref 79–97)
MONOCYTES # BLD AUTO: 0.87 10*3/MM3 (ref 0.1–0.9)
MONOCYTES NFR BLD AUTO: 9.1 % (ref 5–12)
NEUTROPHILS NFR BLD AUTO: 4.97 10*3/MM3 (ref 1.7–7)
NEUTROPHILS NFR BLD AUTO: 51.9 % (ref 42.7–76)
NRBC BLD AUTO-RTO: 0 /100 WBC (ref 0–0.2)
PATHOLOGY REVIEW: YES
PLATELET # BLD AUTO: 292 10*3/MM3 (ref 140–450)
PMV BLD AUTO: 9.8 FL (ref 6–12)
RBC # BLD AUTO: 5.29 10*6/MM3 (ref 4.14–5.8)
WBC NRBC COR # BLD: 9.58 10*3/MM3 (ref 3.4–10.8)

## 2023-05-22 PROCEDURE — 85025 COMPLETE CBC W/AUTO DIFF WBC: CPT

## 2023-05-22 PROCEDURE — 81263 IGH VARI REGIONAL MUTATION: CPT

## 2023-05-22 PROCEDURE — 36415 COLL VENOUS BLD VENIPUNCTURE: CPT

## 2023-05-23 LAB
LAB AP CASE REPORT: NORMAL
PATH REPORT.FINAL DX SPEC: NORMAL
PATH REPORT.GROSS SPEC: NORMAL

## 2023-05-25 LAB
ASSESSMENT OF LEUKOCYTES: NORMAL
CELLS ANALYZED: NORMAL
CELLS COUNTED: NORMAL
CLINICAL CYTOGENETICIST SPEC: NORMAL
CLINICAL INFO: NORMAL
GATING STRATEGY: NORMAL
GENETIC DISEASES BLD/T FISH: NORMAL
IMMUNOPHENOTYPING STUDY: NORMAL
LABORATORY COMMENT REPORT: NORMAL
Lab: NORMAL
NARRATIVE DIAGNOSTIC REPORT-IMP: NORMAL
PATH INTERP SPEC-IMP: NORMAL
PATHOLOGIST NAME: NORMAL
SPECIMEN SOURCE: NORMAL
SPECIMEN SOURCE: NORMAL
VIABLE CELLS NFR SPEC: NORMAL %

## 2023-05-30 LAB — DIAGNOSTIC IMP SPEC-IMP: NORMAL

## 2023-06-06 ENCOUNTER — OFFICE VISIT (OUTPATIENT)
Dept: ONCOLOGY | Facility: CLINIC | Age: 63
End: 2023-06-06
Payer: COMMERCIAL

## 2023-06-06 VITALS
BODY MASS INDEX: 30.13 KG/M2 | WEIGHT: 192 LBS | OXYGEN SATURATION: 96 % | TEMPERATURE: 97.7 F | HEIGHT: 67 IN | DIASTOLIC BLOOD PRESSURE: 84 MMHG | HEART RATE: 74 BPM | RESPIRATION RATE: 20 BRPM | SYSTOLIC BLOOD PRESSURE: 131 MMHG

## 2023-06-06 DIAGNOSIS — D72.820 LYMPHOCYTOSIS: Primary | ICD-10-CM

## 2023-06-06 PROCEDURE — 99214 OFFICE O/P EST MOD 30 MIN: CPT | Performed by: INTERNAL MEDICINE

## 2023-06-06 NOTE — LETTER
June 6, 2023       No Recipients    Patient: Kirt Lundberg   YOB: 1960   Date of Visit: 6/6/2023       Dear Lizabeth Mane PA-C    Kirt Lundberg was in my office today. Below is a copy of my note.    If you have questions, please do not hesitate to call me. I look forward to following Kirt along with you.         Sincerely,        Hector Hemphill MD        CC:   No Recipients    CHIEF COMPLAINT: No new somatic complaints    Problem List:  Oncology/Hematology History Overview Note   1.  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  2.  Hypertension  3.  Hyperlipidemia  4.  Hyperglycemia  5.  Elevated alkaline phosphatase 142 on 4/27/2022.  143 on 10/11/2022.  AMA - 11/15/2022 with fractionated alkaline phosphatase normal percentages predominantly liver.  .  12/1/2022 liver ultrasound showing fatty liver.  Gastroenterology recommended screening colonoscopy which was performed by Lucien Aguilar on 1/11/2023 showing diverticula with three 2 mm sessile polyps in the ascending colon with single polypectomy performed in the ascending colon and repeat colonoscopy recommended in 3-5 years.  6.  History of reflux and irritable bowel    Hematology history timeline:  -4/27/2022 white count 10,800 normal with upper limit of normal 10,800 unremarkable CBC  -12/12/2022 white count 11,200 and unremarkable differential with absolute lymphocyte count 3200 upper limit of normal 3100  -1/30/2023 white count 11,100 absolute lymphocyte count 4000  -3/14/2023 white count 10,900 absolute lymphocyte count 4000    -4/25/2023 [facility] consult: Patient here for evaluation of chronic mild lymphocytosis dating back at least a year and never with an absolute lymphocyte count over 5000.  No bed drenching night sweats or unexplained weight loss or unexplained fevers.  No bulky adenopathy.  Does have fatty liver disease with elevated alkaline phosphatase evaluated by  gastroenterology previously.  We will check her peripheral blood smear along with flow cytometry but even if we find a clonal population, with absolute lymphocyte count less than 5000 and without significant anemia, thrombocytopenia, adenopathy, splenomegaly, or constitutional complaints I would call this a benign monoclonal B-cell lymphocytosis and not chronic lymphocytic leukemia.  The odds are this is some low-level reactive process but we shall see.    -5/22/2023 data:  White count normal 9580 with absolute lymphocyte count 3360 upper limit of normal 3100.  Otherwise unremarkable CBC.  Flow cytometry without immunophenotypic abnormality.  No monoclonal B-cell population.  Slight absolute increase in CD4 positive T cells.  No definitive loss or aberrant expression of pan T-cell antigens.  Nothing to suggest neoplastic T-cell process.  ZAP70 negative.  CD38 positive.  CD49 positive.  Immunoglobulin variable heavy chain without result and with no clone.  Normal CLL FISH panel.  peripheral smear review by pathology shows normal indices with minimal atypical lymphocytosis with normal distribution of white cells and rare atypical monocytes/lymphocytes.  May be a manifestation of chronic viral infection.  No schistocytes.    -6/6/2023 [facility]ology follow-up: His May CBC was essentially normal save for a minuscule increase in absolute lymphocyte count with normal differential and reactive lymphocytes on peripheral smear and nothing on his molecular testing or flow cytometry to suggest a clonal lymphoproliferative disorder.  Peripheral smear showed rare atypical lymphocytes that could be reactive from chronic viral illness and we will check his EBV PCR and CMV PCR and HIV.  We will do virtual visit in the weeks ahead to review these results.  Certainly no hint of a malignant clonal lymphoproliferative process.     Lymphocytosis       HISTORY OF PRESENT ILLNESS:  The patient is a 63 y.o. male, here for follow up on  "management of mild reactive lymphocytosis.  No chronic viral symptomatology    Past Medical History:   Diagnosis Date   • GERD (gastroesophageal reflux disease)    • Hyperlipidemia    • Hypertension    • IBS (irritable bowel syndrome)      No past surgical history on file.    No Known Allergies    Family History and Social History reviewed and changed as necessary    REVIEW OF SYSTEM:   No somatic complaints    PHYSICAL EXAM:  No palpable cervical axillary inguinal adenopathy.  No palpable hepatosplenomegaly.  No rashes.    Vitals:    06/06/23 0721   BP: 131/84   Pulse: 74   Resp: 20   Temp: 97.7 °F (36.5 °C)   SpO2: 96%   Weight: 87.1 kg (192 lb)   Height: 170.2 cm (67\")     Vitals:    06/06/23 0721   PainSc: 0-No pain          ECOG score: 0           Vitals reviewed.    ECOG: (0) Fully Active - Able to Carry On All Pre-disease Performance Without Restriction    Lab Results   Component Value Date    HGB 15.3 05/22/2023    HCT 44.7 05/22/2023    MCV 84.5 05/22/2023     05/22/2023    WBC 9.58 05/22/2023    NEUTROABS 4.97 05/22/2023    LYMPHSABS 3.36 (H) 05/22/2023    MONOSABS 0.87 05/22/2023    EOSABS 0.30 05/22/2023    BASOSABS 0.05 05/22/2023       Lab Results   Component Value Date    GLUCOSE 105 (H) 12/12/2022    BUN 15 12/12/2022    CREATININE 1.08 12/12/2022     12/12/2022    K 5.0 12/12/2022     12/12/2022    CO2 22 12/12/2022    CALCIUM 9.8 12/12/2022    ALBUMIN 4.4 12/12/2022    BILITOT 0.3 12/12/2022    ALKPHOS 138 (H) 12/12/2022    AST 26 12/12/2022    ALT 36 12/12/2022             ASSESSMENT & PLAN:  1.  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  2.  Hypertension  3.  Hyperlipidemia  4.  Hyperglycemia  5.  Elevated alkaline phosphatase 142 on 4/27/2022.  143 on 10/11/2022.  AMA - 11/15/2022 with fractionated alkaline phosphatase normal percentages predominantly liver.  .  12/1/2022 liver ultrasound showing fatty liver.  " Gastroenterology recommended screening colonoscopy which was performed by Lucien Aguilar on 1/11/2023 showing diverticula with three 2 mm sessile polyps in the ascending colon with single polypectomy performed in the ascending colon and repeat colonoscopy recommended in 3-5 years.  6.  History of reflux and irritable bowel    Hematology history timeline:  -4/27/2022 white count 10,800 normal with upper limit of normal 10,800 unremarkable CBC  -12/12/2022 white count 11,200 and unremarkable differential with absolute lymphocyte count 3200 upper limit of normal 3100  -1/30/2023 white count 11,100 absolute lymphocyte count 4000  -3/14/2023 white count 10,900 absolute lymphocyte count 4000    -4/25/2023 [facility] hematology consult: Patient here for evaluation of chronic mild lymphocytosis dating back at least a year and never with an absolute lymphocyte count over 5000.  No bed drenching night sweats or unexplained weight loss or unexplained fevers.  No bulky adenopathy.  Does have fatty liver disease with elevated alkaline phosphatase evaluated by gastroenterology previously.  We will check her peripheral blood smear along with flow cytometry but even if we find a clonal population, with absolute lymphocyte count less than 5000 and without significant anemia, thrombocytopenia, adenopathy, splenomegaly, or constitutional complaints I would call this a benign monoclonal B-cell lymphocytosis and not chronic lymphocytic leukemia.  The odds are this is some low-level reactive process but we shall see.    -5/22/2023 data:  White count normal 9580 with absolute lymphocyte count 3360 upper limit of normal 3100.  Otherwise unremarkable CBC.  Flow cytometry without immunophenotypic abnormality.  No monoclonal B-cell population.  Slight absolute increase in CD4 positive T cells.  No definitive loss or aberrant expression of pan T-cell antigens.  Nothing to suggest neoplastic T-cell process.  ZAP70 negative.  CD38 positive.  CD49  positive.  Immunoglobulin variable heavy chain without result and with no clone.  Normal CLL FISH panel.  peripheral smear review by pathology shows normal indices with minimal atypical lymphocytosis with normal distribution of white cells and rare atypical monocytes/lymphocytes.  May be a manifestation of chronic viral infection.  No schistocytes.    -6/6/2023 [facility] hematology follow-up: His May CBC was essentially normal save for a minuscule increase in absolute lymphocyte count with normal differential and reactive lymphocytes on peripheral smear and nothing on his molecular testing or flow cytometry to suggest a clonal lymphoproliferative disorder.  Peripheral smear showed rare atypical lymphocytes that could be reactive from chronic viral illness and we will check his EBV PCR and CMV PCR and HIV.  We will do virtual visit in the weeks ahead to review these results.  Certainly no hint of a malignant clonal lymphoproliferative process.    Total time of care today inclusive of time spent today prior to patient's arrival reviewing interval peripheral smear and multiplicity of data from integrated oncology as outlined above and during visit translating this to the patient and after visit interested to doing the plan as outlined took 30 minutes of patient care time throughout the day today.  Hector Hemphill MD    06/06/2023

## 2023-06-06 NOTE — PROGRESS NOTES
CHIEF COMPLAINT: No new somatic complaints    Problem List:  Oncology/Hematology History Overview Note   1.  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  2.  Hypertension  3.  Hyperlipidemia  4.  Hyperglycemia  5.  Elevated alkaline phosphatase 142 on 4/27/2022.  143 on 10/11/2022.  AMA - 11/15/2022 with fractionated alkaline phosphatase normal percentages predominantly liver.  .  12/1/2022 liver ultrasound showing fatty liver.  Gastroenterology recommended screening colonoscopy which was performed by Lucien Aguilar on 1/11/2023 showing diverticula with three 2 mm sessile polyps in the ascending colon with single polypectomy performed in the ascending colon and repeat colonoscopy recommended in 3-5 years.  6.  History of reflux and irritable bowel    Hematology history timeline:  -4/27/2022 white count 10,800 normal with upper limit of normal 10,800 unremarkable CBC  -12/12/2022 white count 11,200 and unremarkable differential with absolute lymphocyte count 3200 upper limit of normal 3100  -1/30/2023 white count 11,100 absolute lymphocyte count 4000  -3/14/2023 white count 10,900 absolute lymphocyte count 4000    -4/25/2023 [facility]ology consult: Patient here for evaluation of chronic mild lymphocytosis dating back at least a year and never with an absolute lymphocyte count over 5000.  No bed drenching night sweats or unexplained weight loss or unexplained fevers.  No bulky adenopathy.  Does have fatty liver disease with elevated alkaline phosphatase evaluated by gastroenterology previously.  We will check her peripheral blood smear along with flow cytometry but even if we find a clonal population, with absolute lymphocyte count less than 5000 and without significant anemia, thrombocytopenia, adenopathy, splenomegaly, or constitutional complaints I would call this a benign monoclonal B-cell lymphocytosis and not chronic lymphocytic leukemia.  The odds are this  is some low-level reactive process but we shall see.    -5/22/2023 data:  White count normal 9580 with absolute lymphocyte count 3360 upper limit of normal 3100.  Otherwise unremarkable CBC.  Flow cytometry without immunophenotypic abnormality.  No monoclonal B-cell population.  Slight absolute increase in CD4 positive T cells.  No definitive loss or aberrant expression of pan T-cell antigens.  Nothing to suggest neoplastic T-cell process.  ZAP70 negative.  CD38 positive.  CD49 positive.  Immunoglobulin variable heavy chain without result and with no clone.  Normal CLL FISH panel.  peripheral smear review by pathology shows normal indices with minimal atypical lymphocytosis with normal distribution of white cells and rare atypical monocytes/lymphocytes.  May be a manifestation of chronic viral infection.  No schistocytes.    -6/6/2023 [facility] follow-up: His May CBC was essentially normal save for a minuscule increase in absolute lymphocyte count with normal differential and reactive lymphocytes on peripheral smear and nothing on his molecular testing or flow cytometry to suggest a clonal lymphoproliferative disorder.  Peripheral smear showed rare atypical lymphocytes that could be reactive from chronic viral illness and we will check his EBV PCR and CMV PCR and HIV.  We will do virtual visit in the weeks ahead to review these results.  Certainly no hint of a malignant clonal lymphoproliferative process.     Lymphocytosis       HISTORY OF PRESENT ILLNESS:  The patient is a 63 y.o. male, here for follow up on management of mild reactive lymphocytosis.  No chronic viral symptomatology    Past Medical History:   Diagnosis Date    GERD (gastroesophageal reflux disease)     Hyperlipidemia     Hypertension     IBS (irritable bowel syndrome)      No past surgical history on file.    No Known Allergies    Family History and Social History reviewed and changed as necessary    REVIEW OF SYSTEM:   No somatic  "complaints    PHYSICAL EXAM:  No palpable cervical axillary inguinal adenopathy.  No palpable hepatosplenomegaly.  No rashes.    Vitals:    06/06/23 0721   BP: 131/84   Pulse: 74   Resp: 20   Temp: 97.7 °F (36.5 °C)   SpO2: 96%   Weight: 87.1 kg (192 lb)   Height: 170.2 cm (67\")     Vitals:    06/06/23 0721   PainSc: 0-No pain          ECOG score: 0           Vitals reviewed.    ECOG: (0) Fully Active - Able to Carry On All Pre-disease Performance Without Restriction    Lab Results   Component Value Date    HGB 15.3 05/22/2023    HCT 44.7 05/22/2023    MCV 84.5 05/22/2023     05/22/2023    WBC 9.58 05/22/2023    NEUTROABS 4.97 05/22/2023    LYMPHSABS 3.36 (H) 05/22/2023    MONOSABS 0.87 05/22/2023    EOSABS 0.30 05/22/2023    BASOSABS 0.05 05/22/2023       Lab Results   Component Value Date    GLUCOSE 105 (H) 12/12/2022    BUN 15 12/12/2022    CREATININE 1.08 12/12/2022     12/12/2022    K 5.0 12/12/2022     12/12/2022    CO2 22 12/12/2022    CALCIUM 9.8 12/12/2022    ALBUMIN 4.4 12/12/2022    BILITOT 0.3 12/12/2022    ALKPHOS 138 (H) 12/12/2022    AST 26 12/12/2022    ALT 36 12/12/2022             ASSESSMENT & PLAN:  1.  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  2.  Hypertension  3.  Hyperlipidemia  4.  Hyperglycemia  5.  Elevated alkaline phosphatase 142 on 4/27/2022.  143 on 10/11/2022.  AMA - 11/15/2022 with fractionated alkaline phosphatase normal percentages predominantly liver.  .  12/1/2022 liver ultrasound showing fatty liver.  Gastroenterology recommended screening colonoscopy which was performed by Lucien Aguilar on 1/11/2023 showing diverticula with three 2 mm sessile polyps in the ascending colon with single polypectomy performed in the ascending colon and repeat colonoscopy recommended in 3-5 years.  6.  History of reflux and irritable bowel    Hematology history timeline:  -4/27/2022 white count 10,800 normal with upper limit of " normal 10,800 unremarkable CBC  -12/12/2022 white count 11,200 and unremarkable differential with absolute lymphocyte count 3200 upper limit of normal 3100  -1/30/2023 white count 11,100 absolute lymphocyte count 4000  -3/14/2023 white count 10,900 absolute lymphocyte count 4000    -4/25/2023 [facility] consult: Patient here for evaluation of chronic mild lymphocytosis dating back at least a year and never with an absolute lymphocyte count over 5000.  No bed drenching night sweats or unexplained weight loss or unexplained fevers.  No bulky adenopathy.  Does have fatty liver disease with elevated alkaline phosphatase evaluated by gastroenterology previously.  We will check her peripheral blood smear along with flow cytometry but even if we find a clonal population, with absolute lymphocyte count less than 5000 and without significant anemia, thrombocytopenia, adenopathy, splenomegaly, or constitutional complaints I would call this a benign monoclonal B-cell lymphocytosis and not chronic lymphocytic leukemia.  The odds are this is some low-level reactive process but we shall see.    -5/22/2023 data:  White count normal 9580 with absolute lymphocyte count 3360 upper limit of normal 3100.  Otherwise unremarkable CBC.  Flow cytometry without immunophenotypic abnormality.  No monoclonal B-cell population.  Slight absolute increase in CD4 positive T cells.  No definitive loss or aberrant expression of pan T-cell antigens.  Nothing to suggest neoplastic T-cell process.  ZAP70 negative.  CD38 positive.  CD49 positive.  Immunoglobulin variable heavy chain without result and with no clone.  Normal CLL FISH panel.  peripheral smear review by pathology shows normal indices with minimal atypical lymphocytosis with normal distribution of white cells and rare atypical monocytes/lymphocytes.  May be a manifestation of chronic viral infection.  No schistocytes.    -6/6/2023 [facility] follow-up: His May CBC was  essentially normal save for a minuscule increase in absolute lymphocyte count with normal differential and reactive lymphocytes on peripheral smear and nothing on his molecular testing or flow cytometry to suggest a clonal lymphoproliferative disorder.  Peripheral smear showed rare atypical lymphocytes that could be reactive from chronic viral illness and we will check his EBV PCR and CMV PCR and HIV.  We will do virtual visit in the weeks ahead to review these results.  Certainly no hint of a malignant clonal lymphoproliferative process.    Total time of care today inclusive of time spent today prior to patient's arrival reviewing interval peripheral smear and multiplicity of data from integrated oncology as outlined above and during visit translating this to the patient and after visit interested to doing the plan as outlined took 30 minutes of patient care time throughout the day today.  Hector Hemphill MD    06/06/2023

## 2023-06-09 LAB
CMV DNA SERPL NAA+PROBE-ACNC: NEGATIVE IU/ML
CMV DNA SERPL NAA+PROBE-LOG IU: NORMAL LOG10 IU/ML
EBV DNA SERPL NAA+PROBE-ACNC: NEGATIVE IU/ML
HIV 1+2 AB+HIV1 P24 AG SERPL QL IA: NON REACTIVE

## 2023-06-12 ENCOUNTER — OFFICE VISIT (OUTPATIENT)
Dept: FAMILY MEDICINE CLINIC | Facility: CLINIC | Age: 63
End: 2023-06-12
Payer: COMMERCIAL

## 2023-06-12 VITALS
HEIGHT: 67 IN | WEIGHT: 195 LBS | HEART RATE: 81 BPM | OXYGEN SATURATION: 97 % | SYSTOLIC BLOOD PRESSURE: 130 MMHG | BODY MASS INDEX: 30.61 KG/M2 | DIASTOLIC BLOOD PRESSURE: 80 MMHG

## 2023-06-12 DIAGNOSIS — I10 ESSENTIAL HYPERTENSION: ICD-10-CM

## 2023-06-12 DIAGNOSIS — E78.2 MIXED HYPERLIPIDEMIA: Primary | ICD-10-CM

## 2023-06-12 DIAGNOSIS — R19.7 DIARRHEA, UNSPECIFIED TYPE: ICD-10-CM

## 2023-06-12 PROCEDURE — 99214 OFFICE O/P EST MOD 30 MIN: CPT | Performed by: PHYSICIAN ASSISTANT

## 2023-06-12 RX ORDER — LISINOPRIL 10 MG/1
10 TABLET ORAL DAILY
Qty: 90 TABLET | Refills: 1 | Status: SHIPPED | OUTPATIENT
Start: 2023-06-12

## 2023-06-12 RX ORDER — ATORVASTATIN CALCIUM 20 MG/1
20 TABLET, FILM COATED ORAL DAILY
Qty: 90 TABLET | Refills: 1 | Status: SHIPPED | OUTPATIENT
Start: 2023-06-12

## 2023-06-12 NOTE — PROGRESS NOTES
"Chief Complaint  Med Refill (6 month check up )    Subjective          Kirt Lundberg presents to [facility]  History of Present Illness  Patient in today for medication recheck and refills. States blood pressure has been doing well. Here fasting for labs. He states has noticed for past 4+ months he has been bothered by intermittent diarrhea/loose stools- he feels this has been more problematic since having colonoscopy in 2/2023. He states certain foods he may be able to eat one day and then next time, will cause diarrhea. Denies blood to stool. Denies fever. Denies constipation.   Hyperlipidemia  This is a chronic problem. Pertinent negatives include no chest pain or shortness of breath. Current antihyperlipidemic treatment includes statins.   Hypertension  This is a chronic problem. Pertinent negatives include no anxiety, blurred vision, chest pain, headaches, palpitations, peripheral edema or shortness of breath. Current antihypertension treatment includes ACE inhibitors.     Objective   Vital Signs:   /80 (BP Location: Left arm, Patient Position: Sitting, Cuff Size: Large Adult)   Pulse 81   Ht 170.2 cm (67\")   Wt 88.5 kg (195 lb)   SpO2 97%   BMI 30.54 kg/m²     Body mass index is 30.54 kg/m².    Review of Systems   Constitutional:  Negative for fatigue.   HENT:  Negative for congestion and sore throat.    Eyes:  Negative for blurred vision.   Respiratory:  Negative for shortness of breath.    Cardiovascular:  Negative for chest pain and palpitations.   Gastrointestinal:  Positive for diarrhea. Negative for abdominal pain, constipation, nausea, vomiting and GERD.   Neurological:  Negative for dizziness and headache.     Past History:  Medical History: has a past medical history of GERD (gastroesophageal reflux disease), Hyperlipidemia, Hypertension, and IBS (irritable bowel syndrome).   Surgical History: has no past surgical history on file.   Family History: family " history includes Heart attack in his father; Hypertension in his father and mother; Throat cancer in his father.   Social History: reports that he has been smoking cigars. He has never used smokeless tobacco. He reports current alcohol use. Drug use questions deferred to the physician.      Current Outpatient Medications:     atorvastatin (LIPITOR) 20 MG tablet, Take 1 tablet by mouth Daily., Disp: 90 tablet, Rfl: 1    lisinopril (PRINIVIL,ZESTRIL) 10 MG tablet, Take 1 tablet by mouth Daily., Disp: 90 tablet, Rfl: 1  Allergies: Patient has no known allergies.    Physical Exam  Constitutional:       Appearance: Normal appearance.   HENT:      Right Ear: Tympanic membrane normal.      Left Ear: Tympanic membrane normal.      Nose: Nose normal.      Mouth/Throat:      Mouth: Mucous membranes are moist.   Eyes:      Pupils: Pupils are equal, round, and reactive to light.   Cardiovascular:      Rate and Rhythm: Normal rate and regular rhythm.      Heart sounds: Normal heart sounds.   Pulmonary:      Effort: Pulmonary effort is normal.      Breath sounds: Normal breath sounds.   Abdominal:      Palpations: Abdomen is soft.      Tenderness: There is no abdominal tenderness.   Neurological:      Mental Status: He is alert and oriented to person, place, and time.   Psychiatric:         Mood and Affect: Mood normal.         Behavior: Behavior normal.           Assessment and Plan   Diagnoses and all orders for this visit:    1. Mixed hyperlipidemia (Primary)  -     Cancel: Lipid Panel  -     Cancel: Comprehensive Metabolic Panel  -     atorvastatin (LIPITOR) 20 MG tablet; Take 1 tablet by mouth Daily.  Dispense: 90 tablet; Refill: 1  -     Lipid Panel  Refilled atorvastatin. Fasting labs today. Encouraged healthy diet and exercise.  2. Essential hypertension  -     lisinopril (PRINIVIL,ZESTRIL) 10 MG tablet; Take 1 tablet by mouth Daily.  Dispense: 90 tablet; Refill: 1  -     Comprehensive Metabolic Panel  Refilled  lisinopril.   3. Diarrhea, unspecified type  Encouraged good hydration and monitor and avoid aggravating foods as could be more irritable bowel type symptoms- he is going out of town in a few months and is interested in getting back in with GI to discuss and will put in referral. RTC prior for any worsening symptoms if needed.           Follow Up   No follow-ups on file.  Patient was given instructions and counseling regarding his condition or for health maintenance advice. Please see specific information pulled into the AVS if appropriate.     Lizabeth Mane PA-C

## 2023-06-13 LAB
ALBUMIN SERPL-MCNC: 4 G/DL (ref 3.8–4.8)
ALBUMIN/GLOB SERPL: 1.7 {RATIO} (ref 1.2–2.2)
ALP SERPL-CCNC: 130 IU/L (ref 44–121)
ALT SERPL-CCNC: 29 IU/L (ref 0–44)
AST SERPL-CCNC: 20 IU/L (ref 0–40)
BILIRUB SERPL-MCNC: 0.4 MG/DL (ref 0–1.2)
BUN SERPL-MCNC: 13 MG/DL (ref 8–27)
BUN/CREAT SERPL: 13 (ref 10–24)
CALCIUM SERPL-MCNC: 9.5 MG/DL (ref 8.6–10.2)
CHLORIDE SERPL-SCNC: 107 MMOL/L (ref 96–106)
CHOLEST SERPL-MCNC: 133 MG/DL (ref 100–199)
CO2 SERPL-SCNC: 22 MMOL/L (ref 20–29)
CREAT SERPL-MCNC: 1.04 MG/DL (ref 0.76–1.27)
EGFRCR SERPLBLD CKD-EPI 2021: 81 ML/MIN/1.73
GLOBULIN SER CALC-MCNC: 2.4 G/DL (ref 1.5–4.5)
GLUCOSE SERPL-MCNC: 103 MG/DL (ref 70–99)
HDLC SERPL-MCNC: 41 MG/DL
LDLC SERPL CALC-MCNC: 68 MG/DL (ref 0–99)
POTASSIUM SERPL-SCNC: 4.7 MMOL/L (ref 3.5–5.2)
PROT SERPL-MCNC: 6.4 G/DL (ref 6–8.5)
SODIUM SERPL-SCNC: 141 MMOL/L (ref 134–144)
TRIGL SERPL-MCNC: 136 MG/DL (ref 0–149)
VLDLC SERPL CALC-MCNC: 24 MG/DL (ref 5–40)

## 2024-02-23 DIAGNOSIS — E78.2 MIXED HYPERLIPIDEMIA: ICD-10-CM

## 2024-02-23 DIAGNOSIS — I10 ESSENTIAL HYPERTENSION: ICD-10-CM

## 2024-02-26 ENCOUNTER — OFFICE VISIT (OUTPATIENT)
Dept: FAMILY MEDICINE CLINIC | Facility: CLINIC | Age: 64
End: 2024-02-26
Payer: COMMERCIAL

## 2024-02-26 VITALS
HEART RATE: 78 BPM | HEIGHT: 67 IN | BODY MASS INDEX: 29.51 KG/M2 | DIASTOLIC BLOOD PRESSURE: 70 MMHG | OXYGEN SATURATION: 95 % | SYSTOLIC BLOOD PRESSURE: 104 MMHG | WEIGHT: 188 LBS

## 2024-02-26 DIAGNOSIS — Z12.5 PROSTATE CANCER SCREENING: ICD-10-CM

## 2024-02-26 DIAGNOSIS — E78.2 MIXED HYPERLIPIDEMIA: ICD-10-CM

## 2024-02-26 DIAGNOSIS — M54.41 ACUTE BILATERAL LOW BACK PAIN WITH RIGHT-SIDED SCIATICA: Primary | ICD-10-CM

## 2024-02-26 DIAGNOSIS — I10 ESSENTIAL HYPERTENSION: ICD-10-CM

## 2024-02-26 PROCEDURE — 99214 OFFICE O/P EST MOD 30 MIN: CPT | Performed by: PHYSICIAN ASSISTANT

## 2024-02-26 RX ORDER — PREDNISONE 20 MG/1
TABLET ORAL
Qty: 9 TABLET | Refills: 0 | Status: SHIPPED | OUTPATIENT
Start: 2024-02-26

## 2024-02-26 RX ORDER — ATORVASTATIN CALCIUM 20 MG/1
20 TABLET, FILM COATED ORAL DAILY
Qty: 90 TABLET | Refills: 1 | Status: SHIPPED | OUTPATIENT
Start: 2024-02-26

## 2024-02-26 RX ORDER — ATORVASTATIN CALCIUM 20 MG/1
20 TABLET, FILM COATED ORAL DAILY
Qty: 90 TABLET | Refills: 1 | OUTPATIENT
Start: 2024-02-26

## 2024-02-26 RX ORDER — LISINOPRIL 10 MG/1
10 TABLET ORAL DAILY
Qty: 90 TABLET | Refills: 1 | Status: SHIPPED | OUTPATIENT
Start: 2024-02-26

## 2024-02-26 RX ORDER — LISINOPRIL 10 MG/1
10 TABLET ORAL DAILY
Qty: 90 TABLET | Refills: 1 | OUTPATIENT
Start: 2024-02-26

## 2024-02-26 RX ORDER — DICYCLOMINE HYDROCHLORIDE 10 MG/1
CAPSULE ORAL
COMMUNITY

## 2024-02-26 NOTE — PROGRESS NOTES
"Chief Complaint  Leg Pain (Sharp pain right leg ongoing for 1 month)    Subjective          Kirt Lundberg presents to [facility]  History of Present Illness  Patient in today for evaluation on intermittent right leg pain x 1 month. He believes may have started after helping lift a large piece of railroad material. States didn't hurt that day but started to noticed within 3-4 days after. States on occasion will have discomfort to lower back but mostly affecting leg. Pain is worse with putting direct weight on leg. Denies any redness , swelling or pain to muscles of leg. Denies any changes to urine or bowel habits.   Extremity Pain   The pain is present in the right upper leg.  Injury occurred on 2/1/2024. Injury occurred approximately 30 days ago. This injury occurred other. The problem has been comes and goes. The quality of the pain is described as aching, burning and shooting. The pain is at a severity of 8/10. Associated symptoms include an inability to bear weight. Pertinent negatives include no fever, numbness, tingling, lower extremity swelling or redness.       Objective   Vital Signs:   /70   Pulse 78   Ht 170.2 cm (67\")   Wt 85.3 kg (188 lb)   SpO2 95%   BMI 29.44 kg/m²     Body mass index is 29.44 kg/m².    Review of Systems   Constitutional:  Negative for fever.   Gastrointestinal:  Negative for abdominal pain, blood in stool, diarrhea, nausea and vomiting.   Musculoskeletal:  Positive for arthralgias and back pain.   Neurological:  Negative for tingling and numbness.       Past History:  Medical History: has a past medical history of GERD (gastroesophageal reflux disease), Hyperlipidemia, Hypertension, and IBS (irritable bowel syndrome).   Surgical History: has a past surgical history that includes Colonoscopy (2/20/23).   Family History: family history includes Heart attack in his father; Hypertension in his father and mother; Throat cancer in his father. "   Social History: reports that he has been smoking cigars. He has never used smokeless tobacco. He reports current alcohol use. He reports that he does not use drugs.      Current Outpatient Medications:     atorvastatin (LIPITOR) 20 MG tablet, Take 1 tablet by mouth Daily., Disp: 90 tablet, Rfl: 1    dicyclomine (BENTYL) 10 MG capsule, take 1-2 capsules 1-2 times daily as needed for diarrhea, Disp: , Rfl:     lisinopril (PRINIVIL,ZESTRIL) 10 MG tablet, Take 1 tablet by mouth Daily., Disp: 90 tablet, Rfl: 1    predniSONE (DELTASONE) 20 MG tablet, Take 2 tablets by oral route QD x 3 days; then 1 tablet by oral route QD x 3 days, Disp: 9 tablet, Rfl: 0  Allergies: Patient has no known allergies.    Physical Exam  Constitutional:       Appearance: Normal appearance.   Musculoskeletal:      Comments: Pain noted with flexion of lower back; negative sitting SLR; walks with noticeable limp; nontender to palpation of right lower leg; no redness or swelling noted to leg; able to dorsiflex and plantar flex foot without difficulty   Neurological:      Mental Status: He is alert and oriented to person, place, and time.   Psychiatric:         Mood and Affect: Mood normal.         Behavior: Behavior normal.           Assessment and Plan   Diagnoses and all orders for this visit:    1. Acute bilateral low back pain with right-sided sciatica (Primary)  -     XR Spine Lumbar 2 or 3 View (In Office)    Will check xray today of lumbar spine and start on short course of prednisone- denies diabetes or history ulcer; discussed possible side effects- avoid nsaids while taking; discussed if not improving to consider physical therapy and/or orthopedic evaluation if needed   2. Essential hypertension  -     Comprehensive Metabolic Panel  -     TSH  -     CBC & Differential  -     lisinopril (PRINIVIL,ZESTRIL) 10 MG tablet; Take 1 tablet by mouth Daily.  Dispense: 90 tablet; Refill: 1  Fasting labs today;   3. Mixed hyperlipidemia  -      Lipid Panel  -     atorvastatin (LIPITOR) 20 MG tablet; Take 1 tablet by mouth Daily.  Dispense: 90 tablet; Refill: 1  Will check fasting lipid pane today. Encouraged healthy diet and exercise. Refilled atorvastatin.   4. Prostate cancer screening  -     PSA Screen  Will check PSA with labs.   Other orders  -     predniSONE (DELTASONE) 20 MG tablet; Take 2 tablets by oral route QD x 3 days; then 1 tablet by oral route QD x 3 days  Dispense: 9 tablet; Refill: 0            Follow Up   No follow-ups on file.  Patient was given instructions and counseling regarding his condition or for health maintenance advice. Please see specific information pulled into the AVS if appropriate.     Lizabeth Mane PA-C

## 2024-02-27 LAB
ALBUMIN SERPL-MCNC: 4.4 G/DL (ref 3.9–4.9)
ALBUMIN/GLOB SERPL: 1.8 {RATIO} (ref 1.2–2.2)
ALP SERPL-CCNC: 130 IU/L (ref 44–121)
ALT SERPL-CCNC: 31 IU/L (ref 0–44)
AST SERPL-CCNC: 26 IU/L (ref 0–40)
BASOPHILS # BLD AUTO: 0.1 X10E3/UL (ref 0–0.2)
BASOPHILS NFR BLD AUTO: 1 %
BILIRUB SERPL-MCNC: 0.7 MG/DL (ref 0–1.2)
BUN SERPL-MCNC: 20 MG/DL (ref 8–27)
BUN/CREAT SERPL: 19 (ref 10–24)
CALCIUM SERPL-MCNC: 9.3 MG/DL (ref 8.6–10.2)
CHLORIDE SERPL-SCNC: 104 MMOL/L (ref 96–106)
CHOLEST SERPL-MCNC: 128 MG/DL (ref 100–199)
CO2 SERPL-SCNC: 22 MMOL/L (ref 20–29)
CREAT SERPL-MCNC: 1.03 MG/DL (ref 0.76–1.27)
EGFRCR SERPLBLD CKD-EPI 2021: 81 ML/MIN/1.73
EOSINOPHIL # BLD AUTO: 0.4 X10E3/UL (ref 0–0.4)
EOSINOPHIL NFR BLD AUTO: 4 %
ERYTHROCYTE [DISTWIDTH] IN BLOOD BY AUTOMATED COUNT: 13.7 % (ref 11.6–15.4)
GLOBULIN SER CALC-MCNC: 2.5 G/DL (ref 1.5–4.5)
GLUCOSE SERPL-MCNC: 97 MG/DL (ref 70–99)
HCT VFR BLD AUTO: 48.1 % (ref 37.5–51)
HDLC SERPL-MCNC: 39 MG/DL
HGB BLD-MCNC: 16 G/DL (ref 13–17.7)
IMM GRANULOCYTES # BLD AUTO: 0 X10E3/UL (ref 0–0.1)
IMM GRANULOCYTES NFR BLD AUTO: 0 %
LDLC SERPL CALC-MCNC: 68 MG/DL (ref 0–99)
LYMPHOCYTES # BLD AUTO: 3.2 X10E3/UL (ref 0.7–3.1)
LYMPHOCYTES NFR BLD AUTO: 28 %
MCH RBC QN AUTO: 28.8 PG (ref 26.6–33)
MCHC RBC AUTO-ENTMCNC: 33.3 G/DL (ref 31.5–35.7)
MCV RBC AUTO: 87 FL (ref 79–97)
MONOCYTES # BLD AUTO: 0.9 X10E3/UL (ref 0.1–0.9)
MONOCYTES NFR BLD AUTO: 8 %
NEUTROPHILS # BLD AUTO: 6.7 X10E3/UL (ref 1.4–7)
NEUTROPHILS NFR BLD AUTO: 59 %
PLATELET # BLD AUTO: 317 X10E3/UL (ref 150–450)
POTASSIUM SERPL-SCNC: 4.5 MMOL/L (ref 3.5–5.2)
PROT SERPL-MCNC: 6.9 G/DL (ref 6–8.5)
PSA SERPL-MCNC: 1.8 NG/ML (ref 0–4)
RBC # BLD AUTO: 5.56 X10E6/UL (ref 4.14–5.8)
SODIUM SERPL-SCNC: 139 MMOL/L (ref 134–144)
TRIGL SERPL-MCNC: 115 MG/DL (ref 0–149)
TSH SERPL DL<=0.005 MIU/L-ACNC: 2.06 UIU/ML (ref 0.45–4.5)
VLDLC SERPL CALC-MCNC: 21 MG/DL (ref 5–40)
WBC # BLD AUTO: 11.4 X10E3/UL (ref 3.4–10.8)

## 2024-02-29 ENCOUNTER — TELEPHONE (OUTPATIENT)
Dept: FAMILY MEDICINE CLINIC | Facility: CLINIC | Age: 64
End: 2024-02-29
Payer: COMMERCIAL

## 2024-02-29 NOTE — TELEPHONE ENCOUNTER
ATTEMPTED. UNABLE TO LEAVE MESSAGE    HUB TO RELAY----- Message from Lizabeth Mane PA-C sent at 2/28/2024  6:34 PM EST -----  Please let know xray showed moderate multilevel facet hypertrophy/arthritic changes; disc spaces generally well maintained; please see how doing after starting on the steroid as could set up with ortho if symptoms persist; thanks

## 2024-08-26 DIAGNOSIS — I10 ESSENTIAL HYPERTENSION: ICD-10-CM

## 2024-08-26 RX ORDER — LISINOPRIL 10 MG/1
10 TABLET ORAL DAILY
Qty: 30 TABLET | Refills: 0 | Status: SHIPPED | OUTPATIENT
Start: 2024-08-26

## 2024-08-27 NOTE — TELEPHONE ENCOUNTER
"Unable to reach pt, unable to lvm.   Relay     \"Patient is due for an appointment with Kimmy. Please get pt scheduled.\"                  "

## 2024-08-27 NOTE — TELEPHONE ENCOUNTER
"UNABLE TO LEAVE MESSAGE. 3RD CONTACT APPT   Relay     \"Patient is due for an appointment with Kimmy. Please get pt scheduled. \"                  "

## 2024-09-03 DIAGNOSIS — E78.2 MIXED HYPERLIPIDEMIA: ICD-10-CM

## 2024-09-03 RX ORDER — ATORVASTATIN CALCIUM 20 MG/1
20 TABLET, FILM COATED ORAL DAILY
Qty: 30 TABLET | Refills: 0 | Status: SHIPPED | OUTPATIENT
Start: 2024-09-03

## 2024-09-13 ENCOUNTER — OFFICE VISIT (OUTPATIENT)
Dept: FAMILY MEDICINE CLINIC | Facility: CLINIC | Age: 64
End: 2024-09-13
Payer: COMMERCIAL

## 2024-09-13 VITALS
BODY MASS INDEX: 28.72 KG/M2 | HEART RATE: 76 BPM | SYSTOLIC BLOOD PRESSURE: 118 MMHG | HEIGHT: 67 IN | WEIGHT: 183 LBS | OXYGEN SATURATION: 97 % | DIASTOLIC BLOOD PRESSURE: 80 MMHG

## 2024-09-13 DIAGNOSIS — I10 ESSENTIAL HYPERTENSION: ICD-10-CM

## 2024-09-13 DIAGNOSIS — E78.2 MIXED HYPERLIPIDEMIA: ICD-10-CM

## 2024-09-13 DIAGNOSIS — Z13.1 DIABETES MELLITUS SCREENING: ICD-10-CM

## 2024-09-13 DIAGNOSIS — J01.00 ACUTE NON-RECURRENT MAXILLARY SINUSITIS: ICD-10-CM

## 2024-09-13 DIAGNOSIS — Z00.00 ROUTINE PHYSICAL EXAMINATION: Primary | ICD-10-CM

## 2024-09-13 PROCEDURE — 99396 PREV VISIT EST AGE 40-64: CPT | Performed by: PHYSICIAN ASSISTANT

## 2024-09-13 RX ORDER — AMOXICILLIN 500 MG/1
CAPSULE ORAL
Qty: 30 CAPSULE | Refills: 0 | Status: SHIPPED | OUTPATIENT
Start: 2024-09-13

## 2024-09-13 RX ORDER — LISINOPRIL 10 MG/1
10 TABLET ORAL DAILY
Qty: 90 TABLET | Refills: 1 | Status: SHIPPED | OUTPATIENT
Start: 2024-09-13

## 2024-09-13 RX ORDER — ATORVASTATIN CALCIUM 20 MG/1
20 TABLET, FILM COATED ORAL DAILY
Qty: 90 TABLET | Refills: 1 | Status: SHIPPED | OUTPATIENT
Start: 2024-09-13

## 2024-09-13 NOTE — PROGRESS NOTES
"Chief Complaint  Annual Exam (Physical )    Subjective          Kirt Lundberg presents to [facility]  History of Present Illness  Patient in today for routine physical exam.  He states his blood pressure has been doing well.  Denies any chest pain or shortness of breath.  He states he has had nasal congestion and sinus pressure for the last 5 days.  Denies any fever.  Denies any nausea or vomiting.  He states he does have intermittent diarrhea with his chronic IBS symptoms.  Denies blood in the stool.  He states he is going to be following up with GI for liver workup.  He states he no longer needs to follow-up with hematology unless his white blood cell count elevates to higher level.  Hypertension  This is a chronic problem. Pertinent negatives include no blurred vision, chest pain, palpitations, peripheral edema or shortness of breath.   Hyperlipidemia  This is a chronic problem. Pertinent negatives include no chest pain, myalgias or shortness of breath. Current antihyperlipidemic treatment includes statins.       Objective   Vital Signs:   /80   Pulse 76   Ht 170.2 cm (67\")   Wt 83 kg (183 lb)   SpO2 97%   BMI 28.66 kg/m²     Body mass index is 28.66 kg/m².    Review of Systems   Constitutional:  Negative for fatigue and fever.   HENT:  Positive for congestion and sinus pressure. Negative for sore throat.    Eyes:  Negative for blurred vision.   Respiratory:  Negative for cough and shortness of breath.    Cardiovascular:  Negative for chest pain and palpitations.   Gastrointestinal:  Negative for abdominal pain, diarrhea, nausea and vomiting.   Musculoskeletal:  Negative for myalgias.   Neurological:  Negative for dizziness and headache.       Past History:  Medical History: has a past medical history of GERD (gastroesophageal reflux disease), Hyperlipidemia, Hypertension, and IBS (irritable bowel syndrome).   Surgical History: has a past surgical history that includes " Colonoscopy (2/20/23).   Family History: family history includes Heart attack in his father; Hypertension in his father and mother; Throat cancer in his father.   Social History: reports that he has been smoking cigars. He has never used smokeless tobacco. He reports current alcohol use. He reports that he does not use drugs.      Current Outpatient Medications:     atorvastatin (LIPITOR) 20 MG tablet, Take 1 tablet by mouth Daily., Disp: 90 tablet, Rfl: 1    dicyclomine (BENTYL) 10 MG capsule, take 1-2 capsules 1-2 times daily as needed for diarrhea, Disp: , Rfl:     lisinopril (PRINIVIL,ZESTRIL) 10 MG tablet, Take 1 tablet by mouth Daily., Disp: 90 tablet, Rfl: 1  Allergies: Patient has no known allergies.    Physical Exam  Constitutional:       Appearance: Normal appearance.   HENT:      Right Ear: Tympanic membrane normal.      Left Ear: Tympanic membrane normal.      Mouth/Throat:      Pharynx: Oropharynx is clear.   Eyes:      Conjunctiva/sclera: Conjunctivae normal.      Pupils: Pupils are equal, round, and reactive to light.   Cardiovascular:      Rate and Rhythm: Normal rate and regular rhythm.      Heart sounds: Normal heart sounds.   Pulmonary:      Effort: Pulmonary effort is normal.      Breath sounds: Normal breath sounds.   Abdominal:      Palpations: Abdomen is soft.      Tenderness: There is no abdominal tenderness.   Neurological:      Mental Status: He is oriented to person, place, and time.   Psychiatric:         Mood and Affect: Mood normal.         Behavior: Behavior normal.             Assessment and Plan   Diagnoses and all orders for this visit:    1. Routine physical examination (Primary)  Encouraged healthy diet and exercise.  Will check fasting labs today.  2. Acute non-recurrent maxillary sinusitis  Will send across antibiotic.  Encouraged good hydration and rest.  3. Mixed hyperlipidemia  -     atorvastatin (LIPITOR) 20 MG tablet; Take 1 tablet by mouth Daily.  Dispense: 90 tablet;  Refill: 1  -     Lipid Panel  Check fasting lipid panel.  Refilled atorvastatin.  4. Essential hypertension  -     lisinopril (PRINIVIL,ZESTRIL) 10 MG tablet; Take 1 tablet by mouth Daily.  Dispense: 90 tablet; Refill: 1  -     CBC & Differential  -     Comprehensive Metabolic Panel  -     TSH  Refilled lisinopril.  Encouraged healthy diet and exercise.  Continue to monitor blood pressure.  Turn to clinic prior to recheck as needed.  5. Diabetes mellitus screening  -     Hemoglobin A1c            Follow Up   No follow-ups on file.  Patient was given instructions and counseling regarding his condition or for health maintenance advice. Please see specific information pulled into the AVS if appropriate.     Lizabeth Mane PA-C

## 2024-09-14 LAB
ALBUMIN SERPL-MCNC: 4.1 G/DL (ref 3.9–4.9)
ALP SERPL-CCNC: 187 IU/L (ref 44–121)
ALT SERPL-CCNC: 54 IU/L (ref 0–44)
AST SERPL-CCNC: 49 IU/L (ref 0–40)
BASOPHILS # BLD AUTO: 0.1 X10E3/UL (ref 0–0.2)
BASOPHILS NFR BLD AUTO: 1 %
BILIRUB SERPL-MCNC: 0.5 MG/DL (ref 0–1.2)
BUN SERPL-MCNC: 14 MG/DL (ref 8–27)
BUN/CREAT SERPL: 13 (ref 10–24)
CALCIUM SERPL-MCNC: 9.3 MG/DL (ref 8.6–10.2)
CHLORIDE SERPL-SCNC: 102 MMOL/L (ref 96–106)
CHOLEST SERPL-MCNC: 152 MG/DL (ref 100–199)
CO2 SERPL-SCNC: 22 MMOL/L (ref 20–29)
CREAT SERPL-MCNC: 1.05 MG/DL (ref 0.76–1.27)
EGFRCR SERPLBLD CKD-EPI 2021: 79 ML/MIN/1.73
EOSINOPHIL # BLD AUTO: 0.5 X10E3/UL (ref 0–0.4)
EOSINOPHIL NFR BLD AUTO: 6 %
ERYTHROCYTE [DISTWIDTH] IN BLOOD BY AUTOMATED COUNT: 13.4 % (ref 11.6–15.4)
GLOBULIN SER CALC-MCNC: 2.6 G/DL (ref 1.5–4.5)
GLUCOSE SERPL-MCNC: 92 MG/DL (ref 70–99)
HBA1C MFR BLD: 5.9 % (ref 4.8–5.6)
HCT VFR BLD AUTO: 46.8 % (ref 37.5–51)
HDLC SERPL-MCNC: 41 MG/DL
HGB BLD-MCNC: 15.6 G/DL (ref 13–17.7)
IMM GRANULOCYTES # BLD AUTO: 0 X10E3/UL (ref 0–0.1)
IMM GRANULOCYTES NFR BLD AUTO: 1 %
LDLC SERPL CALC-MCNC: 91 MG/DL (ref 0–99)
LYMPHOCYTES # BLD AUTO: 2.3 X10E3/UL (ref 0.7–3.1)
LYMPHOCYTES NFR BLD AUTO: 30 %
MCH RBC QN AUTO: 29.1 PG (ref 26.6–33)
MCHC RBC AUTO-ENTMCNC: 33.3 G/DL (ref 31.5–35.7)
MCV RBC AUTO: 87 FL (ref 79–97)
MONOCYTES # BLD AUTO: 1 X10E3/UL (ref 0.1–0.9)
MONOCYTES NFR BLD AUTO: 13 %
NEUTROPHILS # BLD AUTO: 3.7 X10E3/UL (ref 1.4–7)
NEUTROPHILS NFR BLD AUTO: 49 %
PLATELET # BLD AUTO: 250 X10E3/UL (ref 150–450)
POTASSIUM SERPL-SCNC: 4.4 MMOL/L (ref 3.5–5.2)
PROT SERPL-MCNC: 6.7 G/DL (ref 6–8.5)
RBC # BLD AUTO: 5.37 X10E6/UL (ref 4.14–5.8)
SODIUM SERPL-SCNC: 141 MMOL/L (ref 134–144)
TRIGL SERPL-MCNC: 110 MG/DL (ref 0–149)
TSH SERPL DL<=0.005 MIU/L-ACNC: 1.84 UIU/ML (ref 0.45–4.5)
VLDLC SERPL CALC-MCNC: 20 MG/DL (ref 5–40)
WBC # BLD AUTO: 7.5 X10E3/UL (ref 3.4–10.8)

## 2024-09-24 ENCOUNTER — TELEPHONE (OUTPATIENT)
Dept: FAMILY MEDICINE CLINIC | Facility: CLINIC | Age: 64
End: 2024-09-24
Payer: COMMERCIAL

## 2025-04-06 DIAGNOSIS — E78.2 MIXED HYPERLIPIDEMIA: ICD-10-CM

## 2025-04-06 DIAGNOSIS — I10 ESSENTIAL HYPERTENSION: ICD-10-CM

## 2025-04-07 RX ORDER — LISINOPRIL 10 MG/1
10 TABLET ORAL DAILY
Qty: 30 TABLET | Refills: 0 | Status: SHIPPED | OUTPATIENT
Start: 2025-04-07

## 2025-04-07 RX ORDER — ATORVASTATIN CALCIUM 20 MG/1
20 TABLET, FILM COATED ORAL DAILY
Qty: 30 TABLET | Refills: 0 | Status: SHIPPED | OUTPATIENT
Start: 2025-04-07

## 2025-04-08 NOTE — TELEPHONE ENCOUNTER
Hub relay: left message patient's medications refilled and they need to schedule a medication recheck with Kimmy

## 2025-05-14 DIAGNOSIS — I10 ESSENTIAL HYPERTENSION: ICD-10-CM

## 2025-05-14 DIAGNOSIS — E78.2 MIXED HYPERLIPIDEMIA: ICD-10-CM

## 2025-05-14 RX ORDER — ATORVASTATIN CALCIUM 20 MG/1
20 TABLET, FILM COATED ORAL DAILY
Qty: 30 TABLET | Refills: 0 | Status: SHIPPED | OUTPATIENT
Start: 2025-05-14 | End: 2025-05-19 | Stop reason: SDUPTHER

## 2025-05-14 RX ORDER — LISINOPRIL 10 MG/1
10 TABLET ORAL DAILY
Qty: 30 TABLET | Refills: 0 | Status: SHIPPED | OUTPATIENT
Start: 2025-05-14 | End: 2025-05-19 | Stop reason: SDUPTHER

## 2025-05-19 ENCOUNTER — OFFICE VISIT (OUTPATIENT)
Dept: FAMILY MEDICINE CLINIC | Facility: CLINIC | Age: 65
End: 2025-05-19
Payer: COMMERCIAL

## 2025-05-19 VITALS
SYSTOLIC BLOOD PRESSURE: 140 MMHG | HEART RATE: 71 BPM | OXYGEN SATURATION: 95 % | WEIGHT: 196 LBS | DIASTOLIC BLOOD PRESSURE: 84 MMHG | HEIGHT: 67 IN | BODY MASS INDEX: 30.76 KG/M2

## 2025-05-19 DIAGNOSIS — Z87.891 ENCOUNTER FOR SCREENING FOR ABDOMINAL AORTIC ANEURYSM (AAA) IN PATIENT 50 YEARS OF AGE OR OLDER WITH HISTORY OF SMOKING: ICD-10-CM

## 2025-05-19 DIAGNOSIS — I10 ESSENTIAL HYPERTENSION: Primary | ICD-10-CM

## 2025-05-19 DIAGNOSIS — R73.9 HYPERGLYCEMIA: ICD-10-CM

## 2025-05-19 DIAGNOSIS — Z13.6 ENCOUNTER FOR SCREENING FOR ABDOMINAL AORTIC ANEURYSM (AAA) IN PATIENT 50 YEARS OF AGE OR OLDER WITH HISTORY OF SMOKING: ICD-10-CM

## 2025-05-19 DIAGNOSIS — Z12.5 PROSTATE CANCER SCREENING: ICD-10-CM

## 2025-05-19 DIAGNOSIS — E78.2 MIXED HYPERLIPIDEMIA: ICD-10-CM

## 2025-05-19 PROCEDURE — 99214 OFFICE O/P EST MOD 30 MIN: CPT | Performed by: PHYSICIAN ASSISTANT

## 2025-05-19 RX ORDER — LISINOPRIL 10 MG/1
10 TABLET ORAL DAILY
Qty: 90 TABLET | Refills: 1 | Status: SHIPPED | OUTPATIENT
Start: 2025-05-19

## 2025-05-19 RX ORDER — ATORVASTATIN CALCIUM 20 MG/1
20 TABLET, FILM COATED ORAL DAILY
Qty: 90 TABLET | Refills: 1 | Status: SHIPPED | OUTPATIENT
Start: 2025-05-19

## 2025-05-19 NOTE — PROGRESS NOTES
"Chief Complaint  Hyperlipidemia (Med check )    Subjective          Kirt Lundberg presents to [facility]  History of Present Illness  Patient in today for medication recheck and refills.  States his blood pressure has been doing well.  Denies any chest pain or shortness of breath.  He is here today fasting for labs.  He is currently up-to-date on his colonoscopy.  He denies any changes to bowel or urine habits. Has a small raised skin lesion near left lower eyelid and has been scheduled with eye surgeon for evaluation.   Hyperlipidemia  This is a chronic problem. Pertinent negatives include no chest pain, myalgias or shortness of breath. Current antihyperlipidemic treatment includes statins.   Hypertension  Chronicity:  Chronic  Associated symptoms: no blurred vision, no chest pain, no headaches, no palpitations, no peripheral edema, no shortness of breath and no dizziness        Objective   Vital Signs:   /84   Pulse 71   Ht 170.2 cm (67\")   Wt 88.9 kg (196 lb)   SpO2 95%   BMI 30.70 kg/m²     Body mass index is 30.7 kg/m².    Review of Systems   Constitutional:  Negative for fatigue and fever.   HENT:  Negative for congestion and sore throat.    Eyes:  Negative for blurred vision.   Respiratory:  Negative for cough and shortness of breath.    Cardiovascular:  Negative for chest pain and palpitations.   Gastrointestinal:  Negative for blood in stool, diarrhea, nausea and vomiting.   Genitourinary:  Negative for dysuria and frequency.   Musculoskeletal:  Negative for myalgias.   Neurological:  Negative for dizziness and headache.       Past History:  Medical History: has a past medical history of GERD (gastroesophageal reflux disease), Hyperlipidemia, Hypertension, and IBS (irritable bowel syndrome).   Surgical History: has a past surgical history that includes Colonoscopy (2/20/23).   Family History: family history includes Heart attack in his father; Hypertension in his " father and mother; Throat cancer in his father.   Social History: reports that he quit smoking about 22 years ago. His smoking use included cigarettes and cigars. He started smoking about 52 years ago. He has a 15 pack-year smoking history. He has never used smokeless tobacco. He reports current alcohol use. He reports that he does not use drugs.      Current Outpatient Medications:     atorvastatin (LIPITOR) 20 MG tablet, Take 1 tablet by mouth Daily., Disp: 90 tablet, Rfl: 1    dicyclomine (BENTYL) 10 MG capsule, take 1-2 capsules 1-2 times daily as needed for diarrhea, Disp: , Rfl:     lisinopril (PRINIVIL,ZESTRIL) 10 MG tablet, Take 1 tablet by mouth Daily., Disp: 90 tablet, Rfl: 1  Allergies: Patient has no known allergies.    Physical Exam  Constitutional:       Appearance: Normal appearance.   HENT:      Right Ear: Tympanic membrane normal.      Left Ear: Tympanic membrane normal.      Mouth/Throat:      Pharynx: Oropharynx is clear.   Eyes:      Conjunctiva/sclera: Conjunctivae normal.      Pupils: Pupils are equal, round, and reactive to light.   Cardiovascular:      Rate and Rhythm: Normal rate and regular rhythm.      Heart sounds: Normal heart sounds.   Pulmonary:      Effort: Pulmonary effort is normal.      Breath sounds: Normal breath sounds.   Abdominal:      Palpations: Abdomen is soft.      Tenderness: There is no abdominal tenderness.   Skin:     Comments: Small dry raised spot near left lower eyelid   Neurological:      Mental Status: He is oriented to person, place, and time.   Psychiatric:         Mood and Affect: Mood normal.         Behavior: Behavior normal.             Assessment and Plan   Diagnoses and all orders for this visit:    1. Essential hypertension (Primary)  -     lisinopril (PRINIVIL,ZESTRIL) 10 MG tablet; Take 1 tablet by mouth Daily.  Dispense: 90 tablet; Refill: 1  -     CBC & Differential  -     Comprehensive Metabolic Panel  -     TSH  Encouraged healthy diet and exercise;  refilled lisinopril- continue to monitor blood pressure- rtc prior to recheck as needed  2. Mixed hyperlipidemia  -     atorvastatin (LIPITOR) 20 MG tablet; Take 1 tablet by mouth Daily.  Dispense: 90 tablet; Refill: 1  -     Lipid Panel  Refilled atorvastatin; fasting lipid panel with labs  3. Prostate cancer screening  -     PSA Screen  Will check PSA with labs.   4. Hyperglycemia  -     Hemoglobin A1c  Will check hga1c with labs.   5. Encounter for screening for abdominal aortic aneurysm (AAA) in patient 50 years of age or older with history of smoking  -      aaa screen limited; Future  Will scheduled for AAA screening          Follow Up   Return in about 6 months (around 11/19/2025).  Patient was given instructions and counseling regarding his condition or for health maintenance advice. Please see specific information pulled into the AVS if appropriate.     Lizabeth Mane PA-C

## 2025-05-20 LAB
ALBUMIN SERPL-MCNC: 4.2 G/DL (ref 3.9–4.9)
ALP SERPL-CCNC: 140 IU/L (ref 44–121)
ALT SERPL-CCNC: 30 IU/L (ref 0–44)
AST SERPL-CCNC: 21 IU/L (ref 0–40)
BASOPHILS # BLD AUTO: 0.1 X10E3/UL (ref 0–0.2)
BASOPHILS NFR BLD AUTO: 1 %
BILIRUB SERPL-MCNC: 0.3 MG/DL (ref 0–1.2)
BUN SERPL-MCNC: 17 MG/DL (ref 8–27)
BUN/CREAT SERPL: 14 (ref 10–24)
CALCIUM SERPL-MCNC: 9.2 MG/DL (ref 8.6–10.2)
CHLORIDE SERPL-SCNC: 106 MMOL/L (ref 96–106)
CHOLEST SERPL-MCNC: 193 MG/DL (ref 100–199)
CO2 SERPL-SCNC: 20 MMOL/L (ref 20–29)
CREAT SERPL-MCNC: 1.18 MG/DL (ref 0.76–1.27)
EGFRCR SERPLBLD CKD-EPI 2021: 68 ML/MIN/1.73
EOSINOPHIL # BLD AUTO: 0.4 X10E3/UL (ref 0–0.4)
EOSINOPHIL NFR BLD AUTO: 4 %
ERYTHROCYTE [DISTWIDTH] IN BLOOD BY AUTOMATED COUNT: 14.1 % (ref 11.6–15.4)
GLOBULIN SER CALC-MCNC: 2.6 G/DL (ref 1.5–4.5)
GLUCOSE SERPL-MCNC: 90 MG/DL (ref 70–99)
HBA1C MFR BLD: 5.9 % (ref 4.8–5.6)
HCT VFR BLD AUTO: 48.9 % (ref 37.5–51)
HDLC SERPL-MCNC: 45 MG/DL
HGB BLD-MCNC: 15.5 G/DL (ref 13–17.7)
IMM GRANULOCYTES # BLD AUTO: 0.1 X10E3/UL (ref 0–0.1)
IMM GRANULOCYTES NFR BLD AUTO: 1 %
LDLC SERPL CALC-MCNC: 117 MG/DL (ref 0–99)
LYMPHOCYTES # BLD AUTO: 3.2 X10E3/UL (ref 0.7–3.1)
LYMPHOCYTES NFR BLD AUTO: 34 %
MCH RBC QN AUTO: 28.4 PG (ref 26.6–33)
MCHC RBC AUTO-ENTMCNC: 31.7 G/DL (ref 31.5–35.7)
MCV RBC AUTO: 90 FL (ref 79–97)
MONOCYTES # BLD AUTO: 0.8 X10E3/UL (ref 0.1–0.9)
MONOCYTES NFR BLD AUTO: 8 %
NEUTROPHILS # BLD AUTO: 5 X10E3/UL (ref 1.4–7)
NEUTROPHILS NFR BLD AUTO: 52 %
PLATELET # BLD AUTO: 292 X10E3/UL (ref 150–450)
POTASSIUM SERPL-SCNC: 4.4 MMOL/L (ref 3.5–5.2)
PROT SERPL-MCNC: 6.8 G/DL (ref 6–8.5)
PSA SERPL-MCNC: 1.3 NG/ML (ref 0–4)
RBC # BLD AUTO: 5.46 X10E6/UL (ref 4.14–5.8)
SODIUM SERPL-SCNC: 140 MMOL/L (ref 134–144)
TRIGL SERPL-MCNC: 173 MG/DL (ref 0–149)
TSH SERPL DL<=0.005 MIU/L-ACNC: 2.13 UIU/ML (ref 0.45–4.5)
VLDLC SERPL CALC-MCNC: 31 MG/DL (ref 5–40)
WBC # BLD AUTO: 9.5 X10E3/UL (ref 3.4–10.8)

## 2025-07-02 ENCOUNTER — LAB REQUISITION (OUTPATIENT)
Dept: LAB | Facility: HOSPITAL | Age: 65
End: 2025-07-02
Payer: COMMERCIAL

## 2025-07-02 DIAGNOSIS — C44.1192 BASAL CELL CARCINOMA OF SKIN OF LEFT LOWER EYELID, INCLUDING CANTHUS: ICD-10-CM

## 2025-07-02 PROCEDURE — 88305 TISSUE EXAM BY PATHOLOGIST: CPT | Performed by: OPHTHALMOLOGY

## 2025-07-02 PROCEDURE — 88331 PATH CONSLTJ SURG 1 BLK 1SPC: CPT | Performed by: OPHTHALMOLOGY

## 2025-07-03 LAB
CYTO UR: NORMAL
LAB AP CASE REPORT: NORMAL
LAB AP CLINICAL INFORMATION: NORMAL
Lab: NORMAL
PATH REPORT.FINAL DX SPEC: NORMAL
PATH REPORT.GROSS SPEC: NORMAL